# Patient Record
Sex: FEMALE | URBAN - METROPOLITAN AREA
[De-identification: names, ages, dates, MRNs, and addresses within clinical notes are randomized per-mention and may not be internally consistent; named-entity substitution may affect disease eponyms.]

---

## 2023-06-08 ENCOUNTER — TELEPHONE (OUTPATIENT)
Dept: OBGYN CLINIC | Age: 33
End: 2023-06-08

## 2023-06-08 NOTE — TELEPHONE ENCOUNTER
Pt LVM stating she needs meds for N/V, as she has been out of work X 3 days r/t morning sickness. Is not a current pt. Has 1st OB appt 06/19/23. Returned pt call. Advised pt that since she is not a current pt, we are not allowed to give medical advice or prescribe meds. She will need to visit the ED or UCC as she feels is needed.

## 2023-06-15 PROBLEM — N39.0 ACUTE UTI (URINARY TRACT INFECTION): Status: ACTIVE | Noted: 2021-09-05

## 2023-06-15 PROBLEM — N10 ACUTE PYELONEPHRITIS: Status: ACTIVE | Noted: 2021-09-05

## 2023-06-15 PROBLEM — G43.109 MIGRAINE WITH AURA AND WITHOUT STATUS MIGRAINOSUS, NOT INTRACTABLE: Status: ACTIVE | Noted: 2021-09-20

## 2023-06-15 PROBLEM — A74.9 CHLAMYDIAL INFECTION: Status: ACTIVE | Noted: 2019-08-26

## 2023-06-15 PROBLEM — N20.0 RIGHT NEPHROLITHIASIS: Status: ACTIVE | Noted: 2021-09-06

## 2023-06-15 PROBLEM — D64.9 CHRONIC ANEMIA: Status: ACTIVE | Noted: 2021-09-07

## 2023-06-15 PROBLEM — K08.89 TOOTH PAIN: Status: ACTIVE | Noted: 2023-01-26

## 2023-06-15 PROBLEM — E87.6 HYPOKALEMIA: Status: ACTIVE | Noted: 2021-09-05

## 2023-06-15 PROBLEM — N12 PYELONEPHRITIS OF RIGHT KIDNEY: Status: ACTIVE | Noted: 2021-09-05

## 2023-06-16 ENCOUNTER — PATIENT MESSAGE (OUTPATIENT)
Dept: OBGYN CLINIC | Age: 33
End: 2023-06-16

## 2023-06-19 PROBLEM — R82.71 GBS BACTERIURIA: Status: ACTIVE | Noted: 2023-06-19

## 2023-06-21 DIAGNOSIS — O21.9 NAUSEA/VOMITING IN PREGNANCY: Primary | ICD-10-CM

## 2023-06-21 RX ORDER — PYRIDOXINE HCL (VITAMIN B6) 25 MG
25 TABLET ORAL EVERY 8 HOURS PRN
Qty: 90 TABLET | Refills: 0 | Status: SHIPPED | OUTPATIENT
Start: 2023-06-21 | End: 2023-07-21

## 2023-06-28 ENCOUNTER — ROUTINE PRENATAL (OUTPATIENT)
Dept: OBGYN CLINIC | Age: 33
End: 2023-06-28
Payer: COMMERCIAL

## 2023-06-28 VITALS — SYSTOLIC BLOOD PRESSURE: 118 MMHG | WEIGHT: 161 LBS | DIASTOLIC BLOOD PRESSURE: 84 MMHG | BODY MASS INDEX: 27.64 KG/M2

## 2023-06-28 DIAGNOSIS — Z87.51 HISTORY OF PRETERM LABOR: ICD-10-CM

## 2023-06-28 DIAGNOSIS — Z36.3 ANTENATAL SCREENING FOR MALFORMATION USING ULTRASONICS: Primary | ICD-10-CM

## 2023-06-28 DIAGNOSIS — R82.71 GBS BACTERIURIA: ICD-10-CM

## 2023-06-28 DIAGNOSIS — A74.9 CHLAMYDIAL INFECTION: ICD-10-CM

## 2023-06-28 DIAGNOSIS — Z11.3 SCREEN FOR STD (SEXUALLY TRANSMITTED DISEASE): ICD-10-CM

## 2023-06-28 DIAGNOSIS — Z34.91 ENCOUNTER FOR SUPERVISION OF LOW-RISK PREGNANCY IN FIRST TRIMESTER: ICD-10-CM

## 2023-06-28 DIAGNOSIS — Z11.51 SCREENING FOR HPV (HUMAN PAPILLOMAVIRUS): ICD-10-CM

## 2023-06-28 DIAGNOSIS — Z12.4 PAP SMEAR FOR CERVICAL CANCER SCREENING: ICD-10-CM

## 2023-06-28 DIAGNOSIS — N92.6 MISSED MENSES: ICD-10-CM

## 2023-06-28 PROBLEM — N39.0 ACUTE UTI (URINARY TRACT INFECTION): Status: RESOLVED | Noted: 2021-09-05 | Resolved: 2023-06-28

## 2023-06-28 PROBLEM — K08.89 TOOTH PAIN: Status: RESOLVED | Noted: 2023-01-26 | Resolved: 2023-06-28

## 2023-06-28 PROBLEM — D64.9 CHRONIC ANEMIA: Status: RESOLVED | Noted: 2021-09-07 | Resolved: 2023-06-28

## 2023-06-28 PROBLEM — E87.6 HYPOKALEMIA: Status: RESOLVED | Noted: 2021-09-05 | Resolved: 2023-06-28

## 2023-06-28 PROCEDURE — 76801 OB US < 14 WKS SINGLE FETUS: CPT | Performed by: OBSTETRICS & GYNECOLOGY

## 2023-06-28 PROCEDURE — 99204 OFFICE O/P NEW MOD 45 MIN: CPT | Performed by: OBSTETRICS & GYNECOLOGY

## 2023-06-28 RX ORDER — DIPHENHYDRAMINE HCL 50 MG
1 CAPSULE ORAL DAILY
Qty: 30 CAPSULE | Refills: 3 | Status: SHIPPED | OUTPATIENT
Start: 2023-06-28

## 2023-06-28 RX ORDER — DIPHENHYDRAMINE HYDROCHLORIDE 25 MG/1
25 CAPSULE ORAL 3 TIMES DAILY
Qty: 90 TABLET | Refills: 3 | Status: SHIPPED | OUTPATIENT
Start: 2023-06-28

## 2023-06-28 SDOH — ECONOMIC STABILITY: FOOD INSECURITY: WITHIN THE PAST 12 MONTHS, THE FOOD YOU BOUGHT JUST DIDN'T LAST AND YOU DIDN'T HAVE MONEY TO GET MORE.: PATIENT DECLINED

## 2023-06-28 SDOH — ECONOMIC STABILITY: HOUSING INSECURITY
IN THE LAST 12 MONTHS, WAS THERE A TIME WHEN YOU DID NOT HAVE A STEADY PLACE TO SLEEP OR SLEPT IN A SHELTER (INCLUDING NOW)?: PATIENT REFUSED

## 2023-06-28 SDOH — ECONOMIC STABILITY: FOOD INSECURITY: WITHIN THE PAST 12 MONTHS, YOU WORRIED THAT YOUR FOOD WOULD RUN OUT BEFORE YOU GOT MONEY TO BUY MORE.: PATIENT DECLINED

## 2023-06-28 SDOH — ECONOMIC STABILITY: INCOME INSECURITY: HOW HARD IS IT FOR YOU TO PAY FOR THE VERY BASICS LIKE FOOD, HOUSING, MEDICAL CARE, AND HEATING?: PATIENT DECLINED

## 2023-07-05 ENCOUNTER — TELEPHONE (OUTPATIENT)
Dept: OBGYN CLINIC | Age: 33
End: 2023-07-05

## 2023-07-05 DIAGNOSIS — R82.71 GBS BACTERIURIA: Primary | ICD-10-CM

## 2023-07-05 LAB
C TRACH RRNA CVX QL NAA+PROBE: NEGATIVE
CYTOLOGIST CVX/VAG CYTO: NORMAL
CYTOLOGY CVX/VAG DOC THIN PREP: NORMAL
HPV APTIMA: NEGATIVE
HPV GENOTYPE REFLEX: NORMAL
Lab: NORMAL
Lab: NORMAL
N GONORRHOEA RRNA CVX QL NAA+PROBE: NEGATIVE
PATH REPORT.FINAL DX SPEC: NORMAL
STAT OF ADQ CVX/VAG CYTO-IMP: NORMAL
T VAGINALIS RRNA SPEC QL NAA+PROBE: NEGATIVE

## 2023-07-05 NOTE — TELEPHONE ENCOUNTER
[facility] Reports +GBS in urine but no abx when she went to the pharmacy. [facility] will ask Dr Krystal Mondragon for an RX and will send. Also will check on progesterone as having an issue filling that at the pharmacy as well.

## 2023-07-06 DIAGNOSIS — Z87.51 HISTORY OF PRETERM LABOR: ICD-10-CM

## 2023-07-06 DIAGNOSIS — O09.891 SUPERVISION OF OTHER HIGH RISK PREGNANCIES, FIRST TRIMESTER: Primary | ICD-10-CM

## 2023-07-06 RX ORDER — AMOXICILLIN 500 MG/1
500 CAPSULE ORAL EVERY 8 HOURS
Qty: 15 CAPSULE | Refills: 0 | Status: SHIPPED | OUTPATIENT
Start: 2023-07-06 | End: 2023-07-11

## 2023-07-19 ENCOUNTER — ROUTINE PRENATAL (OUTPATIENT)
Dept: OBGYN CLINIC | Age: 33
End: 2023-07-19

## 2023-07-19 VITALS — WEIGHT: 164 LBS | BODY MASS INDEX: 28.15 KG/M2 | SYSTOLIC BLOOD PRESSURE: 112 MMHG | DIASTOLIC BLOOD PRESSURE: 72 MMHG

## 2023-07-19 DIAGNOSIS — R82.71 GBS BACTERIURIA: ICD-10-CM

## 2023-07-19 DIAGNOSIS — Z34.81 ENCOUNTER FOR SUPERVISION OF OTHER NORMAL PREGNANCY IN FIRST TRIMESTER: ICD-10-CM

## 2023-07-19 DIAGNOSIS — Z87.51 HISTORY OF PRETERM LABOR: ICD-10-CM

## 2023-07-19 DIAGNOSIS — Z36.8A ENCOUNTER FOR ANTENATAL SCREENING FOR OTHER GENETIC DEFECTS: ICD-10-CM

## 2023-07-19 DIAGNOSIS — Z34.91 ENCOUNTER FOR SUPERVISION OF LOW-RISK PREGNANCY IN FIRST TRIMESTER: Primary | ICD-10-CM

## 2023-07-19 PROBLEM — Z34.90 SUPERVISION OF NORMAL PREGNANCY: Status: ACTIVE | Noted: 2023-07-19

## 2023-07-19 NOTE — PROGRESS NOTES
MARGO. [id number].  12w2d   Denies LOF, VB, Ctxs. Good FM.       Vitals:    23 1031   BP: 112/72        History of  labor  CL scheduled for 16 weeks    GBS bacteriuria   Ucx today     Supervision of normal pregnancy   NIPT today        Jocelyne Mancia DO

## 2023-07-22 LAB
BACTERIA SPEC CULT: NORMAL
SERVICE CMNT-IMP: NORMAL

## 2023-07-27 LAB
Lab: NORMAL
NTRA 1P36 DELETION SYNDROME POPULATION-BASED RISK TEXT: NORMAL
NTRA 1P36 DELETION SYNDROME RESULT TEXT: NORMAL
NTRA 1P36 DELETION SYNDROME RISK SCORE TEXT: NORMAL
NTRA 22Q11.2 DELETION SYNDROME POPULATION-BASED RISK TEXT: NORMAL
NTRA 22Q11.2 DELETION SYNDROME RESULT TEXT: NORMAL
NTRA 22Q11.2 DELETION SYNDROME RISK SCORE TEXT: NORMAL
NTRA ANGELMAN SYNDROME POPULATION-BASED RISK TEXT: NORMAL
NTRA ANGELMAN SYNDROME RESULT TEXT: NORMAL
NTRA ANGELMAN SYNDROME RISK SCORE TEXT: NORMAL
NTRA CRI-DU-CHAT SYNDROME POPULATION-BASED RISK TEXT: NORMAL
NTRA CRI-DU-CHAT SYNDROME RESULT TEXT: NORMAL
NTRA CRI-DU-CHAT SYNDROME RISK SCORE TEXT: NORMAL
NTRA FETAL FRACTION: NORMAL
NTRA GENDER OF FETUS: NORMAL
NTRA MONOSOMY X AGE-BASED RISK TEXT: NORMAL
NTRA MONOSOMY X RESULT TEXT: NORMAL
NTRA MONOSOMY X RISK SCORE TEXT: NORMAL
NTRA PRADER-WILLI SYNDROME POPULATION-BASED RISK TEXT: NORMAL
NTRA PRADER-WILLI SYNDROME RESULT TEXT: NORMAL
NTRA PRADER-WILLI SYNDROME RISK SCORE TEXT: NORMAL
NTRA TRIPLOIDY RESULT TEXT: NORMAL
NTRA TRISOMY 13 AGE-BASED RISK TEXT: NORMAL
NTRA TRISOMY 13 RESULT TEXT: NORMAL
NTRA TRISOMY 13 RISK SCORE TEXT: NORMAL
NTRA TRISOMY 18 AGE-BASED RISK TEXT: NORMAL
NTRA TRISOMY 18 RESULT TEXT: NORMAL
NTRA TRISOMY 18 RISK SCORE TEXT: NORMAL
NTRA TRISOMY 21 AGE-BASED RISK TEXT: NORMAL
NTRA TRISOMY 21 RESULT TEXT: NORMAL
NTRA TRISOMY 21 RISK SCORE TEXT: NORMAL

## 2023-08-09 ENCOUNTER — ROUTINE PRENATAL (OUTPATIENT)
Dept: OBGYN CLINIC | Age: 33
End: 2023-08-09

## 2023-08-09 VITALS — SYSTOLIC BLOOD PRESSURE: 114 MMHG | DIASTOLIC BLOOD PRESSURE: 76 MMHG | BODY MASS INDEX: 28.32 KG/M2 | WEIGHT: 165 LBS

## 2023-08-09 DIAGNOSIS — Z34.91 ENCOUNTER FOR SUPERVISION OF LOW-RISK PREGNANCY IN FIRST TRIMESTER: ICD-10-CM

## 2023-08-09 DIAGNOSIS — Z34.81 ENCOUNTER FOR SUPERVISION OF OTHER NORMAL PREGNANCY IN FIRST TRIMESTER: ICD-10-CM

## 2023-08-09 DIAGNOSIS — R82.71 GBS BACTERIURIA: ICD-10-CM

## 2023-08-09 DIAGNOSIS — Z87.51 HISTORY OF PRETERM LABOR: Primary | ICD-10-CM

## 2023-08-09 NOTE — PROGRESS NOTES
MARGO. [id number].  15w2d   Denies LOF, VB, Ctxs. Good FM. Vitals:    23 1011   BP: 114/76        History of  labor  CL today 5cm    Encounter for supervision of low-risk pregnancy in first trimester  Low risk NIPT   Anatomy can at [address] This Encounter   Procedures    AMB POC US OB TRANSVAGINAL     Order Specific Question:   Are you Pregnant? Answer:    Yes        Jocelyne Mancia, DO

## 2023-08-24 ENCOUNTER — HOSPITAL ENCOUNTER (OUTPATIENT)
Age: 33
Discharge: HOME | End: 2023-08-24
Attending: OBSTETRICS & GYNECOLOGY | Admitting: OBSTETRICS & GYNECOLOGY
Payer: COMMERCIAL

## 2023-08-24 ENCOUNTER — TELEPHONE (OUTPATIENT)
Dept: OBGYN CLINIC | Age: 33
End: 2023-08-24

## 2023-08-24 VITALS
HEART RATE: 74 BPM | TEMPERATURE: 98 F | RESPIRATION RATE: 18 BRPM | SYSTOLIC BLOOD PRESSURE: 104 MMHG | OXYGEN SATURATION: 99 % | DIASTOLIC BLOOD PRESSURE: 60 MMHG | HEIGHT: 64 IN | WEIGHT: 165 LBS | BODY MASS INDEX: 28.17 KG/M2

## 2023-08-24 PROCEDURE — 99282 EMERGENCY DEPT VISIT SF MDM: CPT

## 2023-08-24 NOTE — TELEPHONE ENCOUNTER
Pt called with c/o abdominal and low back pain since this morning. Pt is currently 17w3d pregnant with a h/o PTL/PTD. Pt reports on/off abdominal tightening. Pt denies LOF or vaginal bleeding. Pt instructed to go to [facility] L&D for evaluation. Pt agree's and voiced understanding.

## 2023-08-25 NOTE — PROGRESS NOTES
Pt presents to [facility] with c/o ctx since 0700 today. Pt denies vag bleeding and/or LOF. Abd tender upon palpation.  FHT: 145

## 2023-08-28 ENCOUNTER — TELEPHONE (OUTPATIENT)
Dept: OBGYN CLINIC | Age: 33
End: 2023-08-28

## 2023-08-28 NOTE — TELEPHONE ENCOUNTER
Pt called stating she tested positive for COVID on 8/26/23. Pt reports s/sx started on 8/25/523. Pt went to [facility] ED on 8/26/23. Pt reports sore throat, body aches and low grade fever. Safe otc medications/precautions reviewed and sent via [employer]. Pt voiced understanding. Will route message to  for additional recommendations.

## 2023-09-06 ENCOUNTER — ROUTINE PRENATAL (OUTPATIENT)
Dept: OBGYN CLINIC | Age: 33
End: 2023-09-06
Payer: COMMERCIAL

## 2023-09-06 VITALS — SYSTOLIC BLOOD PRESSURE: 116 MMHG | BODY MASS INDEX: 28.15 KG/M2 | WEIGHT: 164 LBS | DIASTOLIC BLOOD PRESSURE: 72 MMHG

## 2023-09-06 DIAGNOSIS — R82.71 GBS BACTERIURIA: Primary | ICD-10-CM

## 2023-09-06 DIAGNOSIS — Z34.91 ENCOUNTER FOR SUPERVISION OF LOW-RISK PREGNANCY IN FIRST TRIMESTER: ICD-10-CM

## 2023-09-06 DIAGNOSIS — Z36.89 ENCOUNTER FOR FETAL ANATOMIC SURVEY: ICD-10-CM

## 2023-09-06 DIAGNOSIS — Z87.51 HISTORY OF PRETERM LABOR: ICD-10-CM

## 2023-09-06 PROBLEM — Z34.90 SUPERVISION OF NORMAL PREGNANCY: Status: RESOLVED | Noted: 2023-07-19 | Resolved: 2023-09-06

## 2023-09-06 PROCEDURE — 76805 OB US >/= 14 WKS SNGL FETUS: CPT | Performed by: OBSTETRICS & GYNECOLOGY

## 2023-09-06 PROCEDURE — 99213 OFFICE O/P EST LOW 20 MIN: CPT | Performed by: OBSTETRICS & GYNECOLOGY

## 2023-09-06 NOTE — PROGRESS NOTES
MARGO. [id number].  19w2d   Denies LOF, VB, Ctxs. Good FM. Vitals:    09/06/23 1126   BP: 116/72        Encounter for supervision of low-risk pregnancy in first trimester  Anatomy scan today complete and normal   Recent COVID infection. Doing well and sx improved. Orders Placed This Encounter   Procedures    AMB POC US OB >= 14 WKS, 1ST GESTATION     Order Specific Question:   Are you Pregnant? Answer:    Yes        Jocelyne Mancia, DO

## 2023-10-04 ENCOUNTER — ROUTINE PRENATAL (OUTPATIENT)
Dept: OBGYN CLINIC | Age: 33
End: 2023-10-04
Payer: COMMERCIAL

## 2023-10-04 VITALS — SYSTOLIC BLOOD PRESSURE: 114 MMHG | BODY MASS INDEX: 28.49 KG/M2 | DIASTOLIC BLOOD PRESSURE: 70 MMHG | WEIGHT: 166 LBS

## 2023-10-04 DIAGNOSIS — R82.71 GBS BACTERIURIA: Primary | ICD-10-CM

## 2023-10-04 DIAGNOSIS — Z87.51 HISTORY OF PRETERM LABOR: ICD-10-CM

## 2023-10-04 DIAGNOSIS — Z13.0 SCREENING FOR IRON DEFICIENCY ANEMIA: ICD-10-CM

## 2023-10-04 DIAGNOSIS — Z34.91 ENCOUNTER FOR SUPERVISION OF LOW-RISK PREGNANCY IN FIRST TRIMESTER: ICD-10-CM

## 2023-10-04 DIAGNOSIS — A74.9 CHLAMYDIAL INFECTION: ICD-10-CM

## 2023-10-04 DIAGNOSIS — Z13.1 SCREENING FOR DIABETES MELLITUS (DM): ICD-10-CM

## 2023-10-04 PROCEDURE — 99213 OFFICE O/P EST LOW 20 MIN: CPT | Performed by: OBSTETRICS & GYNECOLOGY

## 2023-10-04 NOTE — PROGRESS NOTES
MARGO. [id number].  23w2d   Denies LOF, VB, Ctxs. Good FM.       Vitals:    10/04/23 0844   BP: 114/70        Encounter for supervision of low-risk pregnancy in first trimester  Glucola and CBC at [facility]     Orders Placed This Encounter   Procedures    Glucose tolerance, 1 hour only, single test     Standing Status:   Future     Standing Expiration Date:   10/4/2024    CBC     Standing Status:   Future     Standing Expiration Date:   10/4/2024        Jocelyne Mancia, DO

## 2023-10-31 ENCOUNTER — HOSPITAL ENCOUNTER (EMERGENCY)
Age: 33
Discharge: LEFT BEFORE DISCHARGE | End: 2023-10-31

## 2023-11-09 ENCOUNTER — ROUTINE PRENATAL (OUTPATIENT)
Dept: OBGYN CLINIC | Age: 33
End: 2023-11-09
Payer: COMMERCIAL

## 2023-11-09 VITALS — SYSTOLIC BLOOD PRESSURE: 118 MMHG | DIASTOLIC BLOOD PRESSURE: 64 MMHG | BODY MASS INDEX: 29.35 KG/M2 | WEIGHT: 171 LBS

## 2023-11-09 DIAGNOSIS — Z13.0 SCREENING FOR IRON DEFICIENCY ANEMIA: ICD-10-CM

## 2023-11-09 DIAGNOSIS — R82.71 GBS BACTERIURIA: Primary | ICD-10-CM

## 2023-11-09 DIAGNOSIS — Z87.51 HISTORY OF PRETERM LABOR: ICD-10-CM

## 2023-11-09 DIAGNOSIS — Z13.1 SCREENING FOR DIABETES MELLITUS (DM): ICD-10-CM

## 2023-11-09 DIAGNOSIS — Z34.91 ENCOUNTER FOR SUPERVISION OF LOW-RISK PREGNANCY IN FIRST TRIMESTER: ICD-10-CM

## 2023-11-09 LAB
ERYTHROCYTE [DISTWIDTH] IN BLOOD BY AUTOMATED COUNT: 13.1 % (ref 11.9–14.6)
HCT VFR BLD AUTO: 32.8 % (ref 35.8–46.3)
HGB BLD-MCNC: 11 G/DL (ref 11.7–15.4)
MCH RBC QN AUTO: 32.1 PG (ref 26.1–32.9)
MCHC RBC AUTO-ENTMCNC: 33.5 G/DL (ref 31.4–35)
MCV RBC AUTO: 95.6 FL (ref 82–102)
NRBC # BLD: 0 K/UL (ref 0–0.2)
PLATELET # BLD AUTO: 269 K/UL (ref 150–450)
PMV BLD AUTO: 10.1 FL (ref 9.4–12.3)
RBC # BLD AUTO: 3.43 M/UL (ref 4.05–5.2)
WBC # BLD AUTO: 5.9 K/UL (ref 4.3–11.1)

## 2023-11-09 PROCEDURE — 99213 OFFICE O/P EST LOW 20 MIN: CPT | Performed by: OBSTETRICS & GYNECOLOGY

## 2023-11-09 NOTE — ASSESSMENT & PLAN NOTE
Anatomy complete and normal.   NS to last appt where US was scheduled for serial CL  Not needed anymore as now > 28 weeks. Info given for [employer] as she wants a 3D image  Patient gave multiple different times for when she started her Glucola. No insistent she started it at 845 and can get her blood drawn.  Collect with CBC today  Kick counts and labor precautions reviewed

## 2023-11-09 NOTE — PROGRESS NOTES
MARGO. [id number].  28w3d   Denies LOF, VB, Ctxs. Good FM. Vitals:    11/09/23 0943   BP: 118/64        Encounter for supervision of low-risk pregnancy in first trimester  Anatomy complete and normal.   NS to last appt where US was scheduled for serial CL  Not needed anymore as now > 28 weeks. Info given for [employer] as she wants a 3D image  Patient gave multiple different times for when she started her Glucola. No insistent she started it at 845 and can get her blood drawn.  Collect with CBC today  Kick counts and labor precautions reviewed        Jocelyne Mancia, DO

## 2023-11-10 LAB — GLUCOSE 1 HOUR: 77 MG/DL

## 2023-11-27 ENCOUNTER — ROUTINE PRENATAL (OUTPATIENT)
Dept: OBGYN CLINIC | Age: 33
End: 2023-11-27
Payer: COMMERCIAL

## 2023-11-27 VITALS — BODY MASS INDEX: 29.18 KG/M2 | SYSTOLIC BLOOD PRESSURE: 112 MMHG | WEIGHT: 170 LBS | DIASTOLIC BLOOD PRESSURE: 74 MMHG

## 2023-11-27 DIAGNOSIS — Z87.51 HISTORY OF PRETERM LABOR: ICD-10-CM

## 2023-11-27 DIAGNOSIS — R82.71 GBS BACTERIURIA: Primary | ICD-10-CM

## 2023-11-27 DIAGNOSIS — Z34.91 ENCOUNTER FOR SUPERVISION OF LOW-RISK PREGNANCY IN FIRST TRIMESTER: ICD-10-CM

## 2023-11-27 PROBLEM — N12 PYELONEPHRITIS OF RIGHT KIDNEY: Status: RESOLVED | Noted: 2021-09-05 | Resolved: 2023-11-27

## 2023-11-27 PROCEDURE — 99213 OFFICE O/P EST LOW 20 MIN: CPT | Performed by: OBSTETRICS & GYNECOLOGY

## 2023-11-27 NOTE — PROGRESS NOTES
MARGO. [id number].  31w0d   Denies LOF, VB, Ctxs. Good FM. Vitals:    11/27/23 0935   BP: 112/74        Encounter for supervision of low-risk pregnancy in first trimester  Passed glucola  Kick counts and labor precautions reviewed   Flu vaccine recommended  FH 31.5. Patient requesting growth US. No indication based on exam and history. Understand insurance may reject this and she may be left with the bill.        Jocelyne Mancia, DO

## 2023-12-11 ENCOUNTER — ROUTINE PRENATAL (OUTPATIENT)
Dept: OBGYN CLINIC | Age: 33
End: 2023-12-11
Payer: COMMERCIAL

## 2023-12-11 VITALS — BODY MASS INDEX: 29.18 KG/M2 | WEIGHT: 170 LBS | DIASTOLIC BLOOD PRESSURE: 76 MMHG | SYSTOLIC BLOOD PRESSURE: 114 MMHG

## 2023-12-11 DIAGNOSIS — R82.71 GBS BACTERIURIA: Primary | ICD-10-CM

## 2023-12-11 DIAGNOSIS — Z34.91 ENCOUNTER FOR SUPERVISION OF LOW-RISK PREGNANCY IN FIRST TRIMESTER: ICD-10-CM

## 2023-12-11 DIAGNOSIS — Z87.51 HISTORY OF PRETERM LABOR: ICD-10-CM

## 2023-12-11 PROCEDURE — 99213 OFFICE O/P EST LOW 20 MIN: CPT | Performed by: OBSTETRICS & GYNECOLOGY

## 2023-12-11 PROCEDURE — 76816 OB US FOLLOW-UP PER FETUS: CPT | Performed by: OBSTETRICS & GYNECOLOGY

## 2023-12-11 RX ORDER — MAGNESIUM OXIDE 400 MG/1
400 TABLET ORAL 2 TIMES DAILY
Qty: 60 TABLET | Refills: 5 | Status: SHIPPED | OUTPATIENT
Start: 2023-12-11

## 2023-12-11 NOTE — PROGRESS NOTES
MAXWELL [id number].  33w0d   Denies LOF, VB, Ctxs. Good FM.      + increasing headaches. Not on anything for prevention. Reports staying hydrated. Vitals:    12/11/23 0841   BP: 114/76        Encounter for supervision of low-risk pregnancy in first trimester  Growth US (due to patient request): EFW 44, AC57%, normal SOCO  Patient reassured of normal growth and fluid   Kick counts and labor precautions reviewed       BP normal. Start Magnesium oxide 400 mg BID for headache prevention. Red flag sx reviewed. Orders Placed This Encounter   Medications    magnesium oxide (MAG-OX) 400 MG tablet     Sig: Take 1 tablet by mouth 2 times daily     Dispense:  60 tablet     Refill:  5        Orders Placed This Encounter   Procedures    AMB POC US OB RE-EVAL/FOLLOW UP     Order Specific Question:   Are you Pregnant? Answer:    Yes        Jocelyne Mancia, DO

## 2023-12-11 NOTE — ASSESSMENT & PLAN NOTE
Growth US (due to patient request): EFW 44, AC57%, normal SOCO  Patient reassured of normal growth and fluid. BPD and HC < 10%, but prior normal NIPT and intracranial anatomy.    Kick counts and labor precautions reviewed

## 2023-12-18 PROBLEM — O23.43 URINARY TRACT INFECTION IN MOTHER DURING THIRD TRIMESTER OF PREGNANCY: Status: ACTIVE | Noted: 2023-12-18

## 2023-12-18 PROBLEM — R10.9 ABDOMINAL PAIN DURING PREGNANCY IN THIRD TRIMESTER: Status: ACTIVE | Noted: 2023-12-18

## 2023-12-18 PROBLEM — O26.893 ABDOMINAL PAIN DURING PREGNANCY IN THIRD TRIMESTER: Status: ACTIVE | Noted: 2023-12-18

## 2023-12-18 PROBLEM — Z3A.34 34 WEEKS GESTATION OF PREGNANCY: Status: ACTIVE | Noted: 2023-12-18

## 2023-12-18 PROBLEM — O09.213 HISTORY OF PRETERM LABOR, CURRENT PREGNANCY, THIRD TRIMESTER: Status: ACTIVE | Noted: 2023-12-18

## 2023-12-26 ENCOUNTER — HOSPITAL ENCOUNTER (INPATIENT)
Age: 33
LOS: 2 days | Discharge: HOME | DRG: 563 | End: 2023-12-28
Attending: OBSTETRICS & GYNECOLOGY | Admitting: OBSTETRICS & GYNECOLOGY
Payer: COMMERCIAL

## 2023-12-26 PROBLEM — O60.03 PRETERM LABOR IN THIRD TRIMESTER WITHOUT DELIVERY: Status: ACTIVE | Noted: 2023-12-26

## 2023-12-26 LAB
ABO + RH BLD: NORMAL
BASOPHILS # BLD: 0 K/UL (ref 0–0.2)
BASOPHILS NFR BLD: 0 % (ref 0–2)
BLOOD BANK CMNT PATIENT-IMP: NORMAL
BLOOD GROUP ANTIBODIES SERPL: NORMAL
DIFFERENTIAL METHOD BLD: ABNORMAL
EOSINOPHIL # BLD: 0.1 K/UL (ref 0–0.8)
EOSINOPHIL NFR BLD: 2 % (ref 0.5–7.8)
ERYTHROCYTE [DISTWIDTH] IN BLOOD BY AUTOMATED COUNT: 13 % (ref 11.9–14.6)
HCT VFR BLD AUTO: 33.2 % (ref 35.8–46.3)
HGB BLD-MCNC: 11.3 G/DL (ref 11.7–15.4)
IMM GRANULOCYTES # BLD AUTO: 0 K/UL (ref 0–0.5)
IMM GRANULOCYTES NFR BLD AUTO: 0 % (ref 0–5)
LYMPHOCYTES # BLD: 1.7 K/UL (ref 0.5–4.6)
LYMPHOCYTES NFR BLD: 35 % (ref 13–44)
MCH RBC QN AUTO: 31.3 PG (ref 26.1–32.9)
MCHC RBC AUTO-ENTMCNC: 34 G/DL (ref 31.4–35)
MCV RBC AUTO: 92 FL (ref 82–102)
MONOCYTES # BLD: 0.6 K/UL (ref 0.1–1.3)
MONOCYTES NFR BLD: 12 % (ref 4–12)
NEUTS SEG # BLD: 2.5 K/UL (ref 1.7–8.2)
NEUTS SEG NFR BLD: 50 % (ref 43–78)
NRBC # BLD: 0 K/UL (ref 0–0.2)
PLATELET # BLD AUTO: 282 K/UL (ref 150–450)
PMV BLD AUTO: 9.4 FL (ref 9.4–12.3)
RBC # BLD AUTO: 3.61 M/UL (ref 4.05–5.2)
SPECIMEN EXP DATE BLD: NORMAL
WBC # BLD AUTO: 4.9 K/UL (ref 4.3–11.1)

## 2023-12-26 PROCEDURE — 2580000003 HC RX 258: Performed by: OBSTETRICS & GYNECOLOGY

## 2023-12-26 PROCEDURE — 86850 RBC ANTIBODY SCREEN: CPT

## 2023-12-26 PROCEDURE — 99285 EMERGENCY DEPT VISIT HI MDM: CPT

## 2023-12-26 PROCEDURE — 85025 COMPLETE CBC W/AUTO DIFF WBC: CPT

## 2023-12-26 PROCEDURE — 86901 BLOOD TYPING SEROLOGIC RH(D): CPT

## 2023-12-26 PROCEDURE — 1100000000 HC RM PRIVATE

## 2023-12-26 PROCEDURE — 59025 FETAL NON-STRESS TEST: CPT

## 2023-12-26 PROCEDURE — 86900 BLOOD TYPING SEROLOGIC ABO: CPT

## 2023-12-26 PROCEDURE — 6360000002 HC RX W HCPCS: Performed by: OBSTETRICS & GYNECOLOGY

## 2023-12-26 PROCEDURE — 36415 COLL VENOUS BLD VENIPUNCTURE: CPT

## 2023-12-26 RX ORDER — ACETAMINOPHEN 325 MG/1
650 TABLET ORAL EVERY 4 HOURS PRN
Status: DISCONTINUED | OUTPATIENT
Start: 2023-12-26 | End: 2023-12-28 | Stop reason: HOSPADM

## 2023-12-26 RX ORDER — SODIUM CHLORIDE 0.9 % (FLUSH) 0.9 %
5-40 SYRINGE (ML) INJECTION EVERY 12 HOURS SCHEDULED
Status: DISCONTINUED | OUTPATIENT
Start: 2023-12-26 | End: 2023-12-28 | Stop reason: HOSPADM

## 2023-12-26 RX ORDER — SODIUM CHLORIDE 0.9 % (FLUSH) 0.9 %
5-40 SYRINGE (ML) INJECTION PRN
Status: DISCONTINUED | OUTPATIENT
Start: 2023-12-26 | End: 2023-12-28 | Stop reason: HOSPADM

## 2023-12-26 RX ORDER — SODIUM CHLORIDE, SODIUM LACTATE, POTASSIUM CHLORIDE, CALCIUM CHLORIDE 600; 310; 30; 20 MG/100ML; MG/100ML; MG/100ML; MG/100ML
INJECTION, SOLUTION INTRAVENOUS CONTINUOUS
Status: DISCONTINUED | OUTPATIENT
Start: 2023-12-26 | End: 2023-12-28 | Stop reason: HOSPADM

## 2023-12-26 RX ORDER — SODIUM CHLORIDE 9 MG/ML
INJECTION, SOLUTION INTRAVENOUS PRN
Status: DISCONTINUED | OUTPATIENT
Start: 2023-12-26 | End: 2023-12-28 | Stop reason: HOSPADM

## 2023-12-26 RX ORDER — BETAMETHASONE SODIUM PHOSPHATE AND BETAMETHASONE ACETATE 3; 3 MG/ML; MG/ML
12 INJECTION, SUSPENSION INTRA-ARTICULAR; INTRALESIONAL; INTRAMUSCULAR; SOFT TISSUE EVERY 24 HOURS
Status: COMPLETED | OUTPATIENT
Start: 2023-12-26 | End: 2023-12-27

## 2023-12-26 RX ORDER — ONDANSETRON 4 MG/1
4 TABLET, ORALLY DISINTEGRATING ORAL EVERY 8 HOURS PRN
Status: DISCONTINUED | OUTPATIENT
Start: 2023-12-26 | End: 2023-12-28 | Stop reason: HOSPADM

## 2023-12-26 RX ORDER — ONDANSETRON 2 MG/ML
4 INJECTION INTRAMUSCULAR; INTRAVENOUS EVERY 6 HOURS PRN
Status: DISCONTINUED | OUTPATIENT
Start: 2023-12-26 | End: 2023-12-28 | Stop reason: HOSPADM

## 2023-12-26 RX ORDER — ACETAMINOPHEN 650 MG/1
650 SUPPOSITORY RECTAL EVERY 4 HOURS PRN
Status: DISCONTINUED | OUTPATIENT
Start: 2023-12-26 | End: 2023-12-28 | Stop reason: HOSPADM

## 2023-12-26 RX ORDER — SODIUM CHLORIDE, SODIUM LACTATE, POTASSIUM CHLORIDE, AND CALCIUM CHLORIDE .6; .31; .03; .02 G/100ML; G/100ML; G/100ML; G/100ML
1000 INJECTION, SOLUTION INTRAVENOUS ONCE
Status: DISCONTINUED | OUTPATIENT
Start: 2023-12-26 | End: 2023-12-28 | Stop reason: HOSPADM

## 2023-12-26 RX ADMIN — SODIUM CHLORIDE, SODIUM LACTATE, POTASSIUM CHLORIDE, AND CALCIUM CHLORIDE 1000 ML: .6; .31; .03; .02 INJECTION, SOLUTION INTRAVENOUS at 19:44

## 2023-12-26 RX ADMIN — SODIUM CHLORIDE 5 MILLION UNITS: 900 INJECTION INTRAVENOUS at 21:34

## 2023-12-26 RX ADMIN — BETAMETHASONE SODIUM PHOSPHATE AND BETAMETHASONE ACETATE 12 MG: 3; 3 INJECTION, SUSPENSION INTRA-ARTICULAR; INTRALESIONAL; INTRAMUSCULAR at 21:12

## 2023-12-26 RX ADMIN — SODIUM CHLORIDE, POTASSIUM CHLORIDE, SODIUM LACTATE AND CALCIUM CHLORIDE: 600; 310; 30; 20 INJECTION, SOLUTION INTRAVENOUS at 21:34

## 2023-12-26 NOTE — PROGRESS NOTES
Patient presents to [facility] ED with complaints of contractions. Triage assessment as documented.  made aware.

## 2023-12-27 PROCEDURE — 6370000000 HC RX 637 (ALT 250 FOR IP): Performed by: OBSTETRICS & GYNECOLOGY

## 2023-12-27 PROCEDURE — 2580000003 HC RX 258: Performed by: OBSTETRICS & GYNECOLOGY

## 2023-12-27 PROCEDURE — 6360000002 HC RX W HCPCS: Performed by: OBSTETRICS & GYNECOLOGY

## 2023-12-27 PROCEDURE — 1100000000 HC RM PRIVATE

## 2023-12-27 RX ORDER — ZOLPIDEM TARTRATE 5 MG/1
5 TABLET ORAL NIGHTLY PRN
Status: DISCONTINUED | OUTPATIENT
Start: 2023-12-27 | End: 2023-12-28 | Stop reason: HOSPADM

## 2023-12-27 RX ADMIN — SODIUM CHLORIDE 2.5 MILLION UNITS: 9 INJECTION, SOLUTION INTRAVENOUS at 09:44

## 2023-12-27 RX ADMIN — SODIUM CHLORIDE 2.5 MILLION UNITS: 9 INJECTION, SOLUTION INTRAVENOUS at 13:51

## 2023-12-27 RX ADMIN — SODIUM CHLORIDE 2.5 MILLION UNITS: 9 INJECTION, SOLUTION INTRAVENOUS at 05:46

## 2023-12-27 RX ADMIN — SODIUM CHLORIDE 2.5 MILLION UNITS: 9 INJECTION, SOLUTION INTRAVENOUS at 01:30

## 2023-12-27 RX ADMIN — BUTORPHANOL TARTRATE 1 MG: 2 INJECTION, SOLUTION INTRAMUSCULAR; INTRAVENOUS at 01:22

## 2023-12-27 RX ADMIN — SODIUM CHLORIDE, POTASSIUM CHLORIDE, SODIUM LACTATE AND CALCIUM CHLORIDE: 600; 310; 30; 20 INJECTION, SOLUTION INTRAVENOUS at 09:28

## 2023-12-27 RX ADMIN — ZOLPIDEM TARTRATE 5 MG: 5 TABLET ORAL at 21:15

## 2023-12-27 RX ADMIN — BETAMETHASONE SODIUM PHOSPHATE AND BETAMETHASONE ACETATE 12 MG: 3; 3 INJECTION, SUSPENSION INTRA-ARTICULAR; INTRALESIONAL; INTRAMUSCULAR at 21:15

## 2023-12-27 RX ADMIN — SODIUM CHLORIDE 2.5 MILLION UNITS: 9 INJECTION, SOLUTION INTRAVENOUS at 17:40

## 2023-12-28 VITALS
HEART RATE: 90 BPM | OXYGEN SATURATION: 100 % | TEMPERATURE: 98.5 F | DIASTOLIC BLOOD PRESSURE: 61 MMHG | SYSTOLIC BLOOD PRESSURE: 114 MMHG | RESPIRATION RATE: 16 BRPM

## 2023-12-28 PROBLEM — R10.9 ABDOMINAL PAIN DURING PREGNANCY IN THIRD TRIMESTER: Status: RESOLVED | Noted: 2023-12-18 | Resolved: 2023-12-28

## 2023-12-28 PROBLEM — O23.43 URINARY TRACT INFECTION IN MOTHER DURING THIRD TRIMESTER OF PREGNANCY: Status: RESOLVED | Noted: 2023-12-18 | Resolved: 2023-12-28

## 2023-12-28 PROBLEM — O26.893 ABDOMINAL PAIN DURING PREGNANCY IN THIRD TRIMESTER: Status: RESOLVED | Noted: 2023-12-18 | Resolved: 2023-12-28

## 2023-12-28 PROBLEM — Z3A.34 34 WEEKS GESTATION OF PREGNANCY: Status: RESOLVED | Noted: 2023-12-18 | Resolved: 2023-12-28

## 2023-12-28 PROCEDURE — 59025 FETAL NON-STRESS TEST: CPT

## 2023-12-28 PROCEDURE — 6370000000 HC RX 637 (ALT 250 FOR IP): Performed by: OBSTETRICS & GYNECOLOGY

## 2023-12-28 PROCEDURE — 59025 FETAL NON-STRESS TEST: CPT | Performed by: OBSTETRICS & GYNECOLOGY

## 2023-12-28 PROCEDURE — 99238 HOSP IP/OBS DSCHRG MGMT 30/<: CPT | Performed by: OBSTETRICS & GYNECOLOGY

## 2023-12-28 RX ADMIN — ACETAMINOPHEN 650 MG: 325 TABLET, FILM COATED ORAL at 08:37

## 2023-12-28 NOTE — PROGRESS NOTES
Dr. Rubi Sark in to see patient and discuss plan of care. Orders recd to dishcharge patient home with labor precautions. Patient agrees with plan of care. Discharge paperwork in progress.

## 2023-12-28 NOTE — PROGRESS NOTES
Discharge information given to patient on  labor. Patient verbalized understanding. No questions or needs noted. Patient left unit with significant other.

## 2023-12-28 NOTE — PLAN OF CARE

## 2024-01-03 ENCOUNTER — ROUTINE PRENATAL (OUTPATIENT)
Dept: OBGYN CLINIC | Age: 34
End: 2024-01-03
Payer: COMMERCIAL

## 2024-01-03 VITALS — DIASTOLIC BLOOD PRESSURE: 74 MMHG | BODY MASS INDEX: 29.52 KG/M2 | SYSTOLIC BLOOD PRESSURE: 118 MMHG | WEIGHT: 172 LBS

## 2024-01-03 DIAGNOSIS — O36.8130 DECREASED FETAL MOVEMENTS IN THIRD TRIMESTER, SINGLE OR UNSPECIFIED FETUS: ICD-10-CM

## 2024-01-03 DIAGNOSIS — Z34.91 ENCOUNTER FOR SUPERVISION OF LOW-RISK PREGNANCY IN FIRST TRIMESTER: ICD-10-CM

## 2024-01-03 DIAGNOSIS — R82.71 GBS BACTERIURIA: Primary | ICD-10-CM

## 2024-01-03 DIAGNOSIS — Z87.51 HISTORY OF PRETERM LABOR: ICD-10-CM

## 2024-01-03 PROCEDURE — 59025 FETAL NON-STRESS TEST: CPT | Performed by: OBSTETRICS & GYNECOLOGY

## 2024-01-03 PROCEDURE — 99213 OFFICE O/P EST LOW 20 MIN: CPT | Performed by: OBSTETRICS & GYNECOLOGY

## 2024-01-03 NOTE — ASSESSMENT & PLAN NOTE
12/11 growth US (due to patient request): EFW 44, AC57%, normal SOCO  Admission 12/26 > 12/28 for PTCs. BMZ mature. Discharged home unchanged.

## 2024-01-03 NOTE — PROGRESS NOTES
MAXWELL .  36w2d   Denies LOF, VB, Ctxs.  Decreased FM, but not doing her kick counts.       Admitted for PTCs and discharged. Then went to [facility] on  with no change. Asking why she can't be induced as this is the longest she has gone with a pregnancy and her priors did not go to the NICU. Reports she is miserable and has pressure.     Asking why I cannot strip her membranes to induce labor.     Vitals:    24 0932   BP: 118/74      SVE /40/-3, head not well applied     Encounter for supervision of low-risk pregnancy in first trimester   growth US (due to patient request): EFW 44, AC57%, normal SOCO  Admission  >  for PTCs. BMZ mature. Discharged home unchanged.     Discussion with patient on increased risk to baby and for  complications with PTD. Cannot induce her just for discomfort. High chance she will labor on her own and strict  labor precautions reviewed, but PTCs alone are not an indication  for  induction.     Kick counts and labor precautions reviewed      Erendira Marmolejo   24     Estimated Date of Delivery: 24   Patient's last menstrual period was 2023 (approximate).     Indication: decreased FM  Duration of monitorin minutes   Baseline: 140  Variability: Moderate  Accelerations: 15x15  Decelerations: none    [place]: irritability     Impression: Reactive       Orders Placed This Encounter   Procedures    FETAL NON-STRESS TEST        Jocelyne Mancia DO

## 2024-01-10 NOTE — PROGRESS NOTES
OB return  Erendira Marmolejo is a 33 y.o. female   with 37w3d IUP with Estimated Date of Delivery: 24 presenting to the clinic as a routine OB.     States has been regular contractions however, unable to state how far apart as has not been timing them today. Was admitted at 36 weeks for PTC's without delivery on - BMZ mature.   She denies leakage of fluid or vaginal bleeding and reports active fetal movement.     Denies HA, blurred vision or RUQ pain.     Taking prenatal vitamins: Yes    FHTs: 140s    Problem list reviewed and updated    Pregnancy complicated by:  1. GBS bacteriuria-will treat IP.   2. Hx  labor-Hx of PTL x 3 (35-36 weeks). growth US (due to patient request): EFW 44, AC57%, normal SOCO   3. Hx of STDs-Hx of trich and chlamydia. GC/CT neg  4. Right nephrolithiasis-Patient was recently admitted for pyelonephritis and found to have nephro lithiasis. Has upcoming urology appointment.     Physical Examination:  /68   Wt 77.6 kg (171 lb)   LMP 2023 (Approximate)   BMI 29.35 kg/m²    Gen: AAOx3  Ab: soft, NTTP, gravid uterus  Skin: no edema  SVE: 2-370/-2    Plan:  --SVE: 2-3/70/-2, patient requesting membrane sweep today. Membrane sweep done.   --Discussed GBS pos and will plan to treat IP.   --Most recent Hgb collected on =11.3  --RTC 1 week for routine ob visit.    Strict PTL/labor precautions, FMC, and pregnancy warning signs reviewed. Pt advised to call the office at [phone] or go straight to Labor and Delivery at [facility] with any of the following concerns vaginal bleeding, leaking of fluid, ron regularly Q 5-7 minutes for over an hour or not feeling the baby move.     Kick counts and labor precautions reviewed.

## 2024-01-11 ENCOUNTER — ROUTINE PRENATAL (OUTPATIENT)
Dept: OBGYN CLINIC | Age: 34
End: 2024-01-11
Payer: COMMERCIAL

## 2024-01-11 VITALS — DIASTOLIC BLOOD PRESSURE: 68 MMHG | WEIGHT: 171 LBS | BODY MASS INDEX: 29.35 KG/M2 | SYSTOLIC BLOOD PRESSURE: 121 MMHG

## 2024-01-11 DIAGNOSIS — Z87.51 HISTORY OF PRETERM LABOR: ICD-10-CM

## 2024-01-11 DIAGNOSIS — O09.213 HISTORY OF PRETERM LABOR, CURRENT PREGNANCY, THIRD TRIMESTER: ICD-10-CM

## 2024-01-11 DIAGNOSIS — O60.03 PRETERM LABOR IN THIRD TRIMESTER WITHOUT DELIVERY: ICD-10-CM

## 2024-01-11 DIAGNOSIS — A74.9 CHLAMYDIAL INFECTION: ICD-10-CM

## 2024-01-11 DIAGNOSIS — Z3A.37 37 WEEKS GESTATION OF PREGNANCY: ICD-10-CM

## 2024-01-11 DIAGNOSIS — Z34.91 ENCOUNTER FOR SUPERVISION OF LOW-RISK PREGNANCY IN FIRST TRIMESTER: Primary | ICD-10-CM

## 2024-01-11 DIAGNOSIS — R82.71 GBS BACTERIURIA: ICD-10-CM

## 2024-01-11 PROCEDURE — 99213 OFFICE O/P EST LOW 20 MIN: CPT | Performed by: NURSE PRACTITIONER

## 2024-01-11 NOTE — PATIENT INSTRUCTIONS
PTL/labor precautions, FMC, and pregnancy warning signs reviewed. Pt advised to call the office at [phone] or go straight to Labor and Delivery at [facility] with any of the following concerns vaginal bleeding, leaking of fluid, ron regularly Q 5-7 minutes for over an hour or not feeling the baby move.     Kick counts and labor precautions reviewed

## 2024-01-12 ENCOUNTER — HOSPITAL ENCOUNTER (INPATIENT)
Age: 34
LOS: 1 days | Discharge: HOME | End: 2024-01-13
Attending: OBSTETRICS & GYNECOLOGY | Admitting: OBSTETRICS & GYNECOLOGY
Payer: COMMERCIAL

## 2024-01-12 PROCEDURE — 1100000000 HC RM PRIVATE

## 2024-01-13 VITALS
HEART RATE: 82 BPM | OXYGEN SATURATION: 99 % | SYSTOLIC BLOOD PRESSURE: 118 MMHG | DIASTOLIC BLOOD PRESSURE: 75 MMHG | RESPIRATION RATE: 16 BRPM | TEMPERATURE: 98.6 F

## 2024-01-13 PROBLEM — O47.1 FALSE LABOR AFTER 37 COMPLETED WEEKS OF GESTATION: Status: ACTIVE | Noted: 2024-01-13

## 2024-01-13 PROBLEM — Z3A.37 37 WEEKS GESTATION OF PREGNANCY: Status: ACTIVE | Noted: 2024-01-13

## 2024-01-13 PROCEDURE — 59025 FETAL NON-STRESS TEST: CPT

## 2024-01-13 PROCEDURE — 99282 EMERGENCY DEPT VISIT SF MDM: CPT

## 2024-01-13 ASSESSMENT — ENCOUNTER SYMPTOMS
VOMITING: 0
NAUSEA: 0
BACK PAIN: 1
ABDOMINAL PAIN: 1

## 2024-01-13 NOTE — PROGRESS NOTES
Provider Testing: nonstress test    Indications:  37.5 weeks,  contractions    NST results::  Reactive    EFM:  baseline 130, accelerations present,  decelerations absent, moderate variablity  [place]:  q 7-8 minutes, lasting   60-80 seconds, mild to moderate    Start: 12:06 am  End:  12:35 am    Category:  1

## 2024-01-13 NOTE — H&P
HISTORY AND PHYSICAL             Date: 2024        Patient Name: Erendira Marmolejo     YOB: 1990      Age:  33 y.o.    Chief Complaint     Chief Complaint   Patient presents with    Contractions           History Obtained From   patient    History of Present Illness   patient is a 34 yo  with SERJIO 2024 at  37 weeks 5 days by lmp who presents with contractions    Onset: last evening about 10 pm  Quality:  painful every 5 minutes  Severity:  6/10  Associated: reports good fetal movement, no VB or LOF ; cervix was 2-3/70%/-2 at last office visit on 2024    Prenatal Care: [facility] - prenatal records reviewed, pregnancy complicated by hx of Ptd, GBS Bacteruria    OB History    Para Term  AB Living   7 4 1 3 2 4   SAB IAB Ectopic Molar Multiple Live Births   1   1            # Outcome Date GA Lbr Christopher/2nd Weight Sex Delivery Anes PTL Lv   7 Current            6  17 35w0d  2.722 kg (6 lb) M Vag-Spont         Complications:  Labor, Gestational diabetes,  delivery   5  02/06/15 36w0d  2.381 kg (5 lb 4 oz) F Vag-Spont         Complications:  Labor, Gestational diabetes,  delivery   4  07/15/12 35w0d  2.268 kg (5 lb) M Vag-Spont         Complications:  Labor, Gestational diabetes,  delivery   3 SAB               Birth Comments: D&C   2 Ectopic 2012           1 Term 11 37w0d  3.402 kg (7 lb 8 oz) M Vag-Spont         Complications: Gestational diabetes         Past Medical History     Past Medical History:   Diagnosis Date    Acute UTI 2021    Chlamydia 2019    Chronic anemia 2021    Hypokalemia 2021    Migraine with aura 2021    Right nephrolithiasis 2021    Trichimoniasis 2023    treated by ED        Past Surgical History     Past Surgical History:   Procedure Laterality Date    DILATION AND CURETTAGE OF UTERUS      SAB    ECTOPIC PREGNANCY SURGERY

## 2024-01-13 NOTE — DISCHARGE INSTRUCTIONS
Activity as tolerated  No heavy lifting, pushing or pulling  Rest and sleep on your side  Continue a regular diet with 8-10 glasses of water a day    Call your provider if any of the following:  Excessive vaginal bleeding like a period  Your water breaks  Contractions:           -Greater than 37 weeks: every 4-5 min lasting 40-60 seconds for 1-2 hours          -Less than 37 weeks: 6 or more contractions per hour that do not improve with rest and hydration  4. Decreased fetal movement: your baby should move 10 times in a 2 hour period  5. Severe pain     Week 37 of Your Pregnancy: Care Instructions    Most babies are born between 37 and 40 weeks.   This is a good time to pack a bag to take with you to the birth. Then it will be ready to go when you are.     Learn about breastfeeding.  For example, find out about ways to hold your baby to make breastfeeding easier. And think about learning how to pump and store milk.     Know that crying is normal.  It's common for babies to cry 1 to 3 hours a day. Some cry more, and some cry less.     Learn why babies cry.  They may be hungry; have gas; need a diaper change; or feel cold, warm, tired, lonely, or tense. Sometimes they cry for unknown reasons.     Think about what will help you stay calm when your baby cries.  Taking slow, deep breaths can help. So can taking a break. It's okay to put your baby somewhere safe (like their crib) and walk away for a few minutes.     Learn about safe sleep for your baby.  Always put your baby to sleep on their back. Place them alone in a crib or bassinet with a firm, flat surface. Keep soft items like stuffed animals out of the crib.     Learn what to expect with  poop.  Your baby will have their own bowel patterns. Some babies have several bowel movements a day. Some have fewer.     Know that  babies will often have loose, yellow bowel movements.  Formula-fed babies have more formed stools. If your baby's poop looks like

## 2024-01-17 ENCOUNTER — ROUTINE PRENATAL (OUTPATIENT)
Dept: OBGYN CLINIC | Age: 34
End: 2024-01-17
Payer: COMMERCIAL

## 2024-01-17 VITALS — SYSTOLIC BLOOD PRESSURE: 118 MMHG | DIASTOLIC BLOOD PRESSURE: 76 MMHG | BODY MASS INDEX: 29.87 KG/M2 | WEIGHT: 174 LBS

## 2024-01-17 DIAGNOSIS — R82.71 GBS BACTERIURIA: Primary | ICD-10-CM

## 2024-01-17 DIAGNOSIS — Z87.51 HISTORY OF PRETERM LABOR: ICD-10-CM

## 2024-01-17 DIAGNOSIS — Z34.91 ENCOUNTER FOR SUPERVISION OF LOW-RISK PREGNANCY IN FIRST TRIMESTER: ICD-10-CM

## 2024-01-17 PROBLEM — Z3A.37 37 WEEKS GESTATION OF PREGNANCY: Status: RESOLVED | Noted: 2024-01-13 | Resolved: 2024-01-17

## 2024-01-17 PROBLEM — O47.1 FALSE LABOR AFTER 37 COMPLETED WEEKS OF GESTATION: Status: RESOLVED | Noted: 2024-01-13 | Resolved: 2024-01-17

## 2024-01-17 PROBLEM — O60.03 PRETERM LABOR IN THIRD TRIMESTER WITHOUT DELIVERY: Status: RESOLVED | Noted: 2023-12-26 | Resolved: 2024-01-17

## 2024-01-17 PROCEDURE — 99213 OFFICE O/P EST LOW 20 MIN: CPT | Performed by: OBSTETRICS & GYNECOLOGY

## 2024-01-18 ENCOUNTER — TELEPHONE (OUTPATIENT)
Dept: OBGYN CLINIC | Age: 34
End: 2024-01-18

## 2024-01-18 NOTE — TELEPHONE ENCOUNTER
Originally placed request for EIOL on 1/22/24 with Dr Yusuf.  Per Antepartum scheduling they are full that day.      Induction scheduled with [facility]. Induction on 1/23/24 (ok per Dr Mancia) with Dr Diego d/t EIOL.   Pt notified of instructions.

## 2024-01-23 ENCOUNTER — HOSPITAL ENCOUNTER (INPATIENT)
Age: 34
LOS: 1 days | Discharge: HOME | DRG: 560 | End: 2024-01-24
Attending: OBSTETRICS & GYNECOLOGY | Admitting: OBSTETRICS & GYNECOLOGY
Payer: COMMERCIAL

## 2024-01-23 ENCOUNTER — ANESTHESIA (OUTPATIENT)
Dept: LABOR AND DELIVERY | Age: 34
DRG: 560 | End: 2024-01-23
Payer: COMMERCIAL

## 2024-01-23 ENCOUNTER — APPOINTMENT (OUTPATIENT)
Dept: LABOR AND DELIVERY | Age: 34
DRG: 560 | End: 2024-01-23
Payer: COMMERCIAL

## 2024-01-23 ENCOUNTER — ANESTHESIA EVENT (OUTPATIENT)
Dept: LABOR AND DELIVERY | Age: 34
DRG: 560 | End: 2024-01-23
Payer: COMMERCIAL

## 2024-01-23 PROBLEM — O09.93 SUPERVISION OF HIGH RISK PREGNANCY IN THIRD TRIMESTER: Status: ACTIVE | Noted: 2024-01-23

## 2024-01-23 LAB
ABO + RH BLD: NORMAL
BASOPHILS # BLD: 0 K/UL (ref 0–0.2)
BASOPHILS NFR BLD: 0 % (ref 0–2)
BLOOD GROUP ANTIBODIES SERPL: NORMAL
DIFFERENTIAL METHOD BLD: ABNORMAL
EOSINOPHIL # BLD: 0 K/UL (ref 0–0.8)
EOSINOPHIL NFR BLD: 1 % (ref 0.5–7.8)
ERYTHROCYTE [DISTWIDTH] IN BLOOD BY AUTOMATED COUNT: 13.1 % (ref 11.9–14.6)
HCT VFR BLD AUTO: 33.5 % (ref 35.8–46.3)
HGB BLD-MCNC: 11.5 G/DL (ref 11.7–15.4)
IMM GRANULOCYTES # BLD AUTO: 0 K/UL (ref 0–0.5)
IMM GRANULOCYTES NFR BLD AUTO: 0 % (ref 0–5)
LYMPHOCYTES # BLD: 1.6 K/UL (ref 0.5–4.6)
LYMPHOCYTES NFR BLD: 28 % (ref 13–44)
MCH RBC QN AUTO: 31.3 PG (ref 26.1–32.9)
MCHC RBC AUTO-ENTMCNC: 34.3 G/DL (ref 31.4–35)
MCV RBC AUTO: 91.3 FL (ref 82–102)
MONOCYTES # BLD: 0.7 K/UL (ref 0.1–1.3)
MONOCYTES NFR BLD: 13 % (ref 4–12)
NEUTS SEG # BLD: 3.2 K/UL (ref 1.7–8.2)
NEUTS SEG NFR BLD: 57 % (ref 43–78)
NRBC # BLD: 0 K/UL (ref 0–0.2)
PLATELET # BLD AUTO: 265 K/UL (ref 150–450)
PMV BLD AUTO: 9.6 FL (ref 9.4–12.3)
RBC # BLD AUTO: 3.67 M/UL (ref 4.05–5.2)
SPECIMEN EXP DATE BLD: NORMAL
WBC # BLD AUTO: 5.6 K/UL (ref 4.3–11.1)

## 2024-01-23 PROCEDURE — 59409 OBSTETRICAL CARE: CPT | Performed by: OBSTETRICS & GYNECOLOGY

## 2024-01-23 PROCEDURE — 86850 RBC ANTIBODY SCREEN: CPT

## 2024-01-23 PROCEDURE — 0UQMXZZ REPAIR VULVA, EXTERNAL APPROACH: ICD-10-PCS | Performed by: OBSTETRICS & GYNECOLOGY

## 2024-01-23 PROCEDURE — 3700000025 EPIDURAL BLOCK: Performed by: ANESTHESIOLOGY

## 2024-01-23 PROCEDURE — 2580000003 HC RX 258: Performed by: OBSTETRICS & GYNECOLOGY

## 2024-01-23 PROCEDURE — 86901 BLOOD TYPING SEROLOGIC RH(D): CPT

## 2024-01-23 PROCEDURE — 6360000002 HC RX W HCPCS: Performed by: NURSE ANESTHETIST, CERTIFIED REGISTERED

## 2024-01-23 PROCEDURE — 3E033VJ INTRODUCTION OF OTHER HORMONE INTO PERIPHERAL VEIN, PERCUTANEOUS APPROACH: ICD-10-PCS | Performed by: OBSTETRICS & GYNECOLOGY

## 2024-01-23 PROCEDURE — 7100000010 HC PHASE II RECOVERY - FIRST 15 MIN

## 2024-01-23 PROCEDURE — 2500000003 HC RX 250 WO HCPCS: Performed by: NURSE ANESTHETIST, CERTIFIED REGISTERED

## 2024-01-23 PROCEDURE — 6370000000 HC RX 637 (ALT 250 FOR IP): Performed by: OBSTETRICS & GYNECOLOGY

## 2024-01-23 PROCEDURE — 1100000000 HC RM PRIVATE

## 2024-01-23 PROCEDURE — 6360000002 HC RX W HCPCS: Performed by: OBSTETRICS & GYNECOLOGY

## 2024-01-23 PROCEDURE — 51701 INSERT BLADDER CATHETER: CPT

## 2024-01-23 PROCEDURE — 7210000100 HC LABOR FEE PER 1 HR

## 2024-01-23 PROCEDURE — 7220000101 HC DELIVERY VAGINAL/SINGLE

## 2024-01-23 PROCEDURE — 10907ZC DRAINAGE OF AMNIOTIC FLUID, THERAPEUTIC FROM PRODUCTS OF CONCEPTION, VIA NATURAL OR ARTIFICIAL OPENING: ICD-10-PCS | Performed by: OBSTETRICS & GYNECOLOGY

## 2024-01-23 PROCEDURE — 85025 COMPLETE CBC W/AUTO DIFF WBC: CPT

## 2024-01-23 PROCEDURE — 86900 BLOOD TYPING SEROLOGIC ABO: CPT

## 2024-01-23 PROCEDURE — 7100000011 HC PHASE II RECOVERY - ADDTL 15 MIN

## 2024-01-23 PROCEDURE — 00HU33Z INSERTION OF INFUSION DEVICE INTO SPINAL CANAL, PERCUTANEOUS APPROACH: ICD-10-PCS | Performed by: ANESTHESIOLOGY

## 2024-01-23 RX ORDER — DOCUSATE SODIUM 100 MG/1
100 CAPSULE, LIQUID FILLED ORAL 2 TIMES DAILY
Status: DISCONTINUED | OUTPATIENT
Start: 2024-01-23 | End: 2024-01-24 | Stop reason: HOSPADM

## 2024-01-23 RX ORDER — ONDANSETRON 8 MG/1
8 TABLET, ORALLY DISINTEGRATING ORAL EVERY 8 HOURS PRN
Status: DISCONTINUED | OUTPATIENT
Start: 2024-01-23 | End: 2024-01-24 | Stop reason: HOSPADM

## 2024-01-23 RX ORDER — METHYLERGONOVINE MALEATE 0.2 MG/ML
200 INJECTION INTRAVENOUS PRN
Status: DISCONTINUED | OUTPATIENT
Start: 2024-01-23 | End: 2024-01-24 | Stop reason: HOSPADM

## 2024-01-23 RX ORDER — FAMOTIDINE 20 MG/1
20 TABLET, FILM COATED ORAL 2 TIMES DAILY PRN
Status: DISCONTINUED | OUTPATIENT
Start: 2024-01-23 | End: 2024-01-24 | Stop reason: HOSPADM

## 2024-01-23 RX ORDER — TRANEXAMIC ACID 10 MG/ML
1000 INJECTION, SOLUTION INTRAVENOUS
Status: DISCONTINUED | OUTPATIENT
Start: 2024-01-23 | End: 2024-01-23

## 2024-01-23 RX ORDER — SODIUM CHLORIDE 0.9 % (FLUSH) 0.9 %
5-40 SYRINGE (ML) INJECTION EVERY 12 HOURS SCHEDULED
Status: DISCONTINUED | OUTPATIENT
Start: 2024-01-23 | End: 2024-01-23

## 2024-01-23 RX ORDER — SODIUM CHLORIDE 9 MG/ML
INJECTION, SOLUTION INTRAVENOUS PRN
Status: DISCONTINUED | OUTPATIENT
Start: 2024-01-23 | End: 2024-01-24 | Stop reason: HOSPADM

## 2024-01-23 RX ORDER — ACETAMINOPHEN 500 MG
1000 TABLET ORAL EVERY 6 HOURS PRN
Status: DISCONTINUED | OUTPATIENT
Start: 2024-01-23 | End: 2024-01-24 | Stop reason: HOSPADM

## 2024-01-23 RX ORDER — LANOLIN
CREAM (ML) TOPICAL PRN
Status: DISCONTINUED | OUTPATIENT
Start: 2024-01-23 | End: 2024-01-24 | Stop reason: HOSPADM

## 2024-01-23 RX ORDER — MISOPROSTOL 200 UG/1
800 TABLET ORAL PRN
Status: DISCONTINUED | OUTPATIENT
Start: 2024-01-23 | End: 2024-01-23

## 2024-01-23 RX ORDER — MISOPROSTOL 200 UG/1
200 TABLET ORAL PRN
Status: DISCONTINUED | OUTPATIENT
Start: 2024-01-23 | End: 2024-01-24 | Stop reason: HOSPADM

## 2024-01-23 RX ORDER — SODIUM CHLORIDE 9 MG/ML
INJECTION, SOLUTION INTRAVENOUS PRN
Status: DISCONTINUED | OUTPATIENT
Start: 2024-01-23 | End: 2024-01-23

## 2024-01-23 RX ORDER — ONDANSETRON 2 MG/ML
4 INJECTION INTRAMUSCULAR; INTRAVENOUS EVERY 6 HOURS PRN
Status: DISCONTINUED | OUTPATIENT
Start: 2024-01-23 | End: 2024-01-23

## 2024-01-23 RX ORDER — FAMOTIDINE 20 MG/1
20 TABLET, FILM COATED ORAL 2 TIMES DAILY PRN
Status: DISCONTINUED | OUTPATIENT
Start: 2024-01-23 | End: 2024-01-23

## 2024-01-23 RX ORDER — DEXTROSE, SODIUM CHLORIDE, SODIUM LACTATE, POTASSIUM CHLORIDE, AND CALCIUM CHLORIDE 5; .6; .31; .03; .02 G/100ML; G/100ML; G/100ML; G/100ML; G/100ML
INJECTION, SOLUTION INTRAVENOUS CONTINUOUS
Status: DISCONTINUED | OUTPATIENT
Start: 2024-01-23 | End: 2024-01-24 | Stop reason: HOSPADM

## 2024-01-23 RX ORDER — SODIUM CHLORIDE 0.9 % (FLUSH) 0.9 %
5-40 SYRINGE (ML) INJECTION PRN
Status: DISCONTINUED | OUTPATIENT
Start: 2024-01-23 | End: 2024-01-24 | Stop reason: HOSPADM

## 2024-01-23 RX ORDER — CARBOPROST TROMETHAMINE 250 UG/ML
250 INJECTION, SOLUTION INTRAMUSCULAR PRN
Status: DISCONTINUED | OUTPATIENT
Start: 2024-01-23 | End: 2024-01-23

## 2024-01-23 RX ORDER — SODIUM CHLORIDE 0.9 % (FLUSH) 0.9 %
5-40 SYRINGE (ML) INJECTION EVERY 12 HOURS SCHEDULED
Status: DISCONTINUED | OUTPATIENT
Start: 2024-01-23 | End: 2024-01-24 | Stop reason: HOSPADM

## 2024-01-23 RX ORDER — TERBUTALINE SULFATE 1 MG/ML
0.25 INJECTION, SOLUTION SUBCUTANEOUS ONCE
Status: DISCONTINUED | OUTPATIENT
Start: 2024-01-23 | End: 2024-01-23

## 2024-01-23 RX ORDER — SODIUM CHLORIDE, SODIUM LACTATE, POTASSIUM CHLORIDE, AND CALCIUM CHLORIDE .6; .31; .03; .02 G/100ML; G/100ML; G/100ML; G/100ML
500 INJECTION, SOLUTION INTRAVENOUS PRN
Status: DISCONTINUED | OUTPATIENT
Start: 2024-01-23 | End: 2024-01-23

## 2024-01-23 RX ORDER — LIDOCAINE HYDROCHLORIDE AND EPINEPHRINE 15; 5 MG/ML; UG/ML
INJECTION, SOLUTION EPIDURAL PRN
Status: DISCONTINUED | OUTPATIENT
Start: 2024-01-23 | End: 2024-01-23 | Stop reason: SDUPTHER

## 2024-01-23 RX ORDER — DEXTROSE, SODIUM CHLORIDE, SODIUM LACTATE, POTASSIUM CHLORIDE, AND CALCIUM CHLORIDE 5; .6; .31; .03; .02 G/100ML; G/100ML; G/100ML; G/100ML; G/100ML
INJECTION, SOLUTION INTRAVENOUS CONTINUOUS
Status: DISCONTINUED | OUTPATIENT
Start: 2024-01-23 | End: 2024-01-23

## 2024-01-23 RX ORDER — ROPIVACAINE HYDROCHLORIDE 2 MG/ML
INJECTION, SOLUTION EPIDURAL; INFILTRATION; PERINEURAL CONTINUOUS PRN
Status: DISCONTINUED | OUTPATIENT
Start: 2024-01-23 | End: 2024-01-23 | Stop reason: SDUPTHER

## 2024-01-23 RX ORDER — OXYCODONE HYDROCHLORIDE 5 MG/1
5 TABLET ORAL EVERY 4 HOURS PRN
Status: DISCONTINUED | OUTPATIENT
Start: 2024-01-23 | End: 2024-01-24 | Stop reason: HOSPADM

## 2024-01-23 RX ORDER — DOCUSATE SODIUM 100 MG/1
100 CAPSULE, LIQUID FILLED ORAL 2 TIMES DAILY
Status: DISCONTINUED | OUTPATIENT
Start: 2024-01-23 | End: 2024-01-23

## 2024-01-23 RX ORDER — OXYCODONE HYDROCHLORIDE 5 MG/1
10 TABLET ORAL EVERY 4 HOURS PRN
Status: DISCONTINUED | OUTPATIENT
Start: 2024-01-23 | End: 2024-01-24 | Stop reason: HOSPADM

## 2024-01-23 RX ORDER — SODIUM CHLORIDE, SODIUM LACTATE, POTASSIUM CHLORIDE, AND CALCIUM CHLORIDE .6; .31; .03; .02 G/100ML; G/100ML; G/100ML; G/100ML
1000 INJECTION, SOLUTION INTRAVENOUS PRN
Status: DISCONTINUED | OUTPATIENT
Start: 2024-01-23 | End: 2024-01-23

## 2024-01-23 RX ORDER — ROPIVACAINE HYDROCHLORIDE 2 MG/ML
INJECTION, SOLUTION EPIDURAL; INFILTRATION; PERINEURAL PRN
Status: DISCONTINUED | OUTPATIENT
Start: 2024-01-23 | End: 2024-01-23 | Stop reason: SDUPTHER

## 2024-01-23 RX ORDER — SIMETHICONE 80 MG
80 TABLET,CHEWABLE ORAL EVERY 6 HOURS PRN
Status: DISCONTINUED | OUTPATIENT
Start: 2024-01-23 | End: 2024-01-24 | Stop reason: HOSPADM

## 2024-01-23 RX ORDER — SODIUM CHLORIDE 0.9 % (FLUSH) 0.9 %
5-40 SYRINGE (ML) INJECTION PRN
Status: DISCONTINUED | OUTPATIENT
Start: 2024-01-23 | End: 2024-01-23

## 2024-01-23 RX ORDER — IBUPROFEN 800 MG/1
800 TABLET ORAL EVERY 8 HOURS SCHEDULED
Status: DISCONTINUED | OUTPATIENT
Start: 2024-01-23 | End: 2024-01-24 | Stop reason: HOSPADM

## 2024-01-23 RX ORDER — METHYLERGONOVINE MALEATE 0.2 MG/ML
200 INJECTION INTRAVENOUS PRN
Status: DISCONTINUED | OUTPATIENT
Start: 2024-01-23 | End: 2024-01-23

## 2024-01-23 RX ADMIN — ROPIVACAINE HYDROCHLORIDE 10 ML/HR: 2 INJECTION, SOLUTION EPIDURAL; INFILTRATION; PERINEURAL at 14:15

## 2024-01-23 RX ADMIN — SODIUM CHLORIDE, SODIUM LACTATE, POTASSIUM CHLORIDE, CALCIUM CHLORIDE AND DEXTROSE MONOHYDRATE: 5; 600; 310; 30; 20 INJECTION, SOLUTION INTRAVENOUS at 12:04

## 2024-01-23 RX ADMIN — SODIUM CHLORIDE, POTASSIUM CHLORIDE, SODIUM LACTATE AND CALCIUM CHLORIDE 1000 ML: 600; 310; 30; 20 INJECTION, SOLUTION INTRAVENOUS at 13:58

## 2024-01-23 RX ADMIN — OXYTOCIN 2 MILLI-UNITS/MIN: 10 INJECTION INTRAVENOUS at 12:20

## 2024-01-23 RX ADMIN — LIDOCAINE HYDROCHLORIDE,EPINEPHRINE BITARTRATE 3 ML: 15; .005 INJECTION, SOLUTION EPIDURAL; INFILTRATION; INTRACAUDAL; PERINEURAL at 14:10

## 2024-01-23 RX ADMIN — IBUPROFEN 800 MG: 800 TABLET, FILM COATED ORAL at 21:59

## 2024-01-23 RX ADMIN — Medication 166.7 ML: at 17:49

## 2024-01-23 RX ADMIN — SODIUM CHLORIDE, PRESERVATIVE FREE 5 ML: 5 INJECTION INTRAVENOUS at 22:00

## 2024-01-23 RX ADMIN — OXYCODONE 10 MG: 5 TABLET ORAL at 20:07

## 2024-01-23 RX ADMIN — SODIUM CHLORIDE 5 MILLION UNITS: 900 INJECTION INTRAVENOUS at 12:07

## 2024-01-23 RX ADMIN — ROPIVACAINE HYDROCHLORIDE 5 ML: 2 INJECTION, SOLUTION EPIDURAL; INFILTRATION; PERINEURAL at 14:16

## 2024-01-23 RX ADMIN — SODIUM CHLORIDE 2.5 MILLION UNITS: 9 INJECTION, SOLUTION INTRAVENOUS at 16:07

## 2024-01-23 RX ADMIN — ACETAMINOPHEN 1000 MG: 500 TABLET, FILM COATED ORAL at 19:09

## 2024-01-23 RX ADMIN — ROPIVACAINE HYDROCHLORIDE 5 ML: 2 INJECTION, SOLUTION EPIDURAL; INFILTRATION; PERINEURAL at 14:14

## 2024-01-23 RX ADMIN — Medication: at 20:37

## 2024-01-23 ASSESSMENT — PAIN DESCRIPTION - LOCATION
LOCATION: PERINEUM
LOCATION: ABDOMEN
LOCATION: ABDOMEN

## 2024-01-23 ASSESSMENT — PAIN DESCRIPTION - DESCRIPTORS
DESCRIPTORS: CRAMPING
DESCRIPTORS: DISCOMFORT
DESCRIPTORS: CRAMPING

## 2024-01-23 ASSESSMENT — PAIN SCALES - GENERAL
PAINLEVEL_OUTOF10: 8
PAINLEVEL_OUTOF10: 6

## 2024-01-23 NOTE — ANESTHESIA PROCEDURE NOTES
Epidural Block    Patient location during procedure: OB  Start time: 1/23/2024 2:07 PM  End time: 1/23/2024 2:09 PM  Reason for block: labor epidural  Staffing  Performed: anesthesiologist   Anesthesiologist: Angel Rahman DO  Performed by: Angel Rahman DO  Authorized by: Angel Rahman DO    Epidural  Patient position: sitting  Prep: ChloraPrep  Patient monitoring: cardiac monitor, continuous pulse ox and frequent blood pressure checks  Approach: midline  Location: L3-4  Injection technique: RASTA saline  Provider prep: mask and sterile gloves  Needle  Needle type: Tuohy   Needle gauge: 17 G  Needle insertion depth: 5 cm  Catheter type: end hole  Catheter size: 19 G  Catheter at skin depth: 11 cm  Test dose: negativeCatheter Secured: tegaderm and tape  Assessment  Hemodynamics: stable  Attempts: 1  Outcomes: uncomplicated and patient tolerated procedure well  Additional Notes  R/B/I of labor epidural discussed.  Pt expresses understanding and is willing to proceed    Timeout performed    In the sitting position, lidocaine used to anesthetize the skin.  Tuohy needle inserted until RASTA to saline occurred.  Catheter threaded without difficulty or paresthesia.  Negative aspiration for CSF/blood.  Pt tolerated the procedure well  Preanesthetic Checklist  Completed: patient identified, IV checked, site marked, risks and benefits discussed, surgical/procedural consents, equipment checked, pre-op evaluation, timeout performed, anesthesia consent given, oxygen available and monitors applied/VS acknowledged

## 2024-01-23 NOTE — H&P
OB L&D H&P  History and Physical    HPI: Erendira Marmolejo is a 33 y.o.  at 39w1d for Estimated Date of Delivery: 24, presents for term IOL. Reports irregular contractions    PNC with Dr. Mancia, [facility], complicated by:  1. Grand multiparity  2. GBS bacteriuria- NKA  3. Hx of PTL x3 35-36 weeks  4. Hx of STDs- SAUL negative  5. Right nephrolithiasis-Patient was recently admitted for pyelonephritis and found to have nephro lithiasis     Past Medical History:   Diagnosis Date    Acute UTI 2021    Chlamydia 2019    Chronic anemia 2021    Hypokalemia 2021    Migraine with aura 2021    Right nephrolithiasis 2021    Trichimoniasis 2023    treated by ED      Past Surgical History:   Procedure Laterality Date    DILATION AND CURETTAGE OF UTERUS      SAB    ECTOPIC PREGNANCY SURGERY      unsure of side and if tube was removed    WISDOM TOOTH EXTRACTION       @OBH@  Social History     Socioeconomic History    Marital status: Single     Spouse name: Not on file    Number of children: Not on file    Years of education: Not on file    Highest education level: Not on file   Occupational History    Not on file   Tobacco Use    Smoking status: Never    Smokeless tobacco: Never   Vaping Use    Vaping Use: Never used   Substance and Sexual Activity    Alcohol use: Not Currently    Drug use: Never    Sexual activity: Yes     Partners: Male   Other Topics Concern    Not on file   Social History Narrative    Not on file     Social Determinants of Health     Financial Resource Strain: Patient Declined (2023)    Overall Financial Resource Strain (CARDIA)     Difficulty of Paying Living Expenses: Patient declined   Food Insecurity: Not on file (2023)   Transportation Needs: Unknown (2023)    PRAPARE - Transportation     Lack of Transportation (Medical): Not on file     Lack of Transportation (Non-Medical): Patient declined   Physical Activity: Not on file   Stress: Not on file   Social

## 2024-01-23 NOTE — ANESTHESIA PRE PROCEDURE
Department of Anesthesiology  Preprocedure Note       Name:  Erendira Marmolejo   Age:  33 y.o.  :  1990                                          MRN:  [id number]         Date:  2024      Surgeon: * No surgeons listed *    Procedure: * No procedures listed *    Medications prior to admission:   Prior to Admission medications    Medication Sig Start Date End Date Taking? Authorizing Provider   ferrous sulfate (IRON 325) 325 (65 Fe) MG tablet Take 1 tablet by mouth daily (with breakfast)    Andry Stacy MD   magnesium oxide (MAG-OX) 400 MG tablet Take 1 tablet by mouth 2 times daily 23   Jocelyne Mancia DO   Prenatal Vit-Fe Fumarate-FA (PRENATAL PO) Take by mouth    ProviderAndry MD       Current medications:    Current Facility-Administered Medications   Medication Dose Route Frequency Provider Last Rate Last Admin    dextrose 5 % in lactated ringers infusion   IntraVENous Continuous Muna Diego  mL/hr at 24 1520 Rate Verify at 24 1520    lactated ringers bolus bolus 500 mL  500 mL IntraVENous PRN Muna Diego MD        Or    lactated ringers bolus bolus 1,000 mL  1,000 mL IntraVENous PRN Muna Diego MD   Stopped at 24 1452    sodium chloride flush 0.9 % injection 5-40 mL  5-40 mL IntraVENous 2 times per day Muna Diego MD        sodium chloride flush 0.9 % injection 5-40 mL  5-40 mL IntraVENous PRN Muna Diego MD        0.9 % sodium chloride infusion   IntraVENous PRN Muna Diego MD        methylergonovine (METHERGINE) injection 200 mcg  200 mcg IntraMUSCular PRN Muna Diego MD        carboprost (HEMABATE) injection 250 mcg  250 mcg IntraMUSCular PRN Muna Diego MD        miSOPROStol (CYTOTEC) tablet 800 mcg  800 mcg Rectal PRN Muna Diego MD        tranexamic acid-NaCl IVPB premix 1,000 mg  1,000 mg IntraVENous Once PRN Muna Diego

## 2024-01-23 NOTE — L&D DELIVERY NOTE
At 17:46, this 33 y.o.  at 39w1d delivered a vigorous female  infant from vtx presentation to a sterile field under epidural anesthesia. Cord clamped x 2, cut. Infant bulb suctioned and put to mother's chest. APGARs 9 at 1 minute and 9 at 5 minutes. 3VC/SI placenta delivered. 30U Pitocin given IV. Fundus firm. Bleeding scant.  1st degree right periurethral laceration repaired in normal fashion with 3-0 vicryl. No packs in vagina. EBL 100cc. Mom and infant stable in LDR.     Staff: Dr. Rocha, Girl Erendira [[id number]]      Labor Events     Labor: No   Steroids: Full Course  Cervical Ripening Date/Time:      Antibiotics Received during Labor: Yes  Rupture Date/Time:  24 13:45:00   Rupture Type: AROM, Intact  Fluid Color: Clear  Induction: AROM, Oxytocin  Labor Complications: None              Anesthesia    Method: Epidural       Labor Event Times      Labor onset date/time:        Dilation complete date/time:  24 17:34:00     Start pushing date/time:  2024 17:41:00   Decision date/time (emergent ):            Labor Length    2nd stage: 0h 12m  3rd stage: 0h 04m       Delivery Details      Delivery Date: 24 Delivery Time: 17:46:00   Delivery Type: Vaginal, Spontaneous               Presentation    Presentation: Vertex       Shoulder Dystocia    Shoulder Dystocia Present?: No       Assisted Delivery Details    Forceps Attempted?: No  Vacuum Extractor Attempted?: No                           Cord    Vessels: 3 Vessels  Complications: None  Delayed Cord Clamping?: Yes  Cord Clamped Date/Time: 2024 17:47:18  Cord Blood Disposition: Lab  Gases Sent?: No              Placenta    Date/Time: 2024 17:50:00  Removal: Spontaneous  Appearance: Intact  Disposition: Discarded       Lacerations    Episiotomy: None  Perineal Lacerations: None  Other Lacerations: periurethral laceration  Periurethral Laceration: Right Repaired?: Yes   Number of

## 2024-01-23 NOTE — PROGRESS NOTES
Labor progress note  Pt resting comfortably without complaints    Cvx: /-2  FHTs: category 1    Pitocin at 2mu/min     AROM with clear fluid    Patient Vitals for the past 8 hrs:   BP Temp Temp src Pulse Resp SpO2   24 1305 107/67 -- -- 76 -- --   24 1235 108/76 -- -- 90 -- --   24 1148 -- -- -- 86 -- 98 %   24 1137 118/73 98.2 °F (36.8 °C) Oral 90 17 98 %       Plan   Continue augmentation   Will increase pit accordingly pending fetal tolerance  Anticipate

## 2024-01-24 VITALS
DIASTOLIC BLOOD PRESSURE: 73 MMHG | TEMPERATURE: 97.7 F | SYSTOLIC BLOOD PRESSURE: 117 MMHG | OXYGEN SATURATION: 95 % | RESPIRATION RATE: 18 BRPM | HEART RATE: 85 BPM

## 2024-01-24 LAB — HGB BLD-MCNC: 11 G/DL (ref 11.7–15.4)

## 2024-01-24 PROCEDURE — 85018 HEMOGLOBIN: CPT

## 2024-01-24 PROCEDURE — 6370000000 HC RX 637 (ALT 250 FOR IP): Performed by: OBSTETRICS & GYNECOLOGY

## 2024-01-24 PROCEDURE — 36415 COLL VENOUS BLD VENIPUNCTURE: CPT

## 2024-01-24 RX ORDER — IBUPROFEN 800 MG/1
800 TABLET ORAL EVERY 8 HOURS PRN
Qty: 100 TABLET | Refills: 0 | Status: SHIPPED | OUTPATIENT
Start: 2024-01-24

## 2024-01-24 RX ORDER — OXYCODONE HYDROCHLORIDE 5 MG/1
5 TABLET ORAL EVERY 8 HOURS PRN
Qty: 6 TABLET | Refills: 0 | Status: SHIPPED | OUTPATIENT
Start: 2024-01-24 | End: 2024-01-31

## 2024-01-24 RX ADMIN — OXYCODONE 10 MG: 5 TABLET ORAL at 18:55

## 2024-01-24 RX ADMIN — OXYCODONE 10 MG: 5 TABLET ORAL at 04:07

## 2024-01-24 RX ADMIN — ACETAMINOPHEN 1000 MG: 500 TABLET, FILM COATED ORAL at 01:21

## 2024-01-24 RX ADMIN — ACETAMINOPHEN 1000 MG: 500 TABLET, FILM COATED ORAL at 16:12

## 2024-01-24 RX ADMIN — OXYCODONE 10 MG: 5 TABLET ORAL at 00:06

## 2024-01-24 RX ADMIN — IBUPROFEN 800 MG: 800 TABLET, FILM COATED ORAL at 13:52

## 2024-01-24 RX ADMIN — ACETAMINOPHEN 1000 MG: 500 TABLET, FILM COATED ORAL at 08:28

## 2024-01-24 RX ADMIN — OXYCODONE 10 MG: 5 TABLET ORAL at 12:29

## 2024-01-24 RX ADMIN — IBUPROFEN 800 MG: 800 TABLET, FILM COATED ORAL at 06:35

## 2024-01-24 RX ADMIN — DOCUSATE SODIUM 100 MG: 100 CAPSULE, LIQUID FILLED ORAL at 08:28

## 2024-01-24 ASSESSMENT — PAIN SCALES - GENERAL
PAINLEVEL_OUTOF10: 7
PAINLEVEL_OUTOF10: 2
PAINLEVEL_OUTOF10: 4
PAINLEVEL_OUTOF10: 7
PAINLEVEL_OUTOF10: 6
PAINLEVEL_OUTOF10: 7
PAINLEVEL_OUTOF10: 4
PAINLEVEL_OUTOF10: 4
PAINLEVEL_OUTOF10: 8

## 2024-01-24 ASSESSMENT — PAIN DESCRIPTION - DESCRIPTORS
DESCRIPTORS: CRAMPING
DESCRIPTORS: CRAMPING;DISCOMFORT
DESCRIPTORS: CRAMPING
DESCRIPTORS: ACHING;CRAMPING
DESCRIPTORS: CRAMPING;DISCOMFORT;BURNING
DESCRIPTORS: CRAMPING
DESCRIPTORS: CRAMPING

## 2024-01-24 ASSESSMENT — PAIN DESCRIPTION - ORIENTATION
ORIENTATION: ANTERIOR;LOWER

## 2024-01-24 ASSESSMENT — PAIN DESCRIPTION - LOCATION
LOCATION: ABDOMEN
LOCATION: ABDOMEN;PERINEUM
LOCATION: ABDOMEN
LOCATION: ABDOMEN
LOCATION: ABDOMEN;PERINEUM
LOCATION: ABDOMEN;PERINEUM
LOCATION: ABDOMEN
LOCATION: ABDOMEN

## 2024-01-24 ASSESSMENT — PAIN - FUNCTIONAL ASSESSMENT
PAIN_FUNCTIONAL_ASSESSMENT: ACTIVITIES ARE NOT PREVENTED

## 2024-01-24 NOTE — PROGRESS NOTES
[facility], [facility].             January 24, 2024             To Whom it May Concern:        This is to certify that Phoenix Fielder has been in the hospital for the birth of his child from 1/23/2024 to 1/25/2024.      Sincerely,    Daily Tong RN

## 2024-01-24 NOTE — DISCHARGE INSTRUCTIONS
signs of infection, such as:  A fever.  Frequent or painful urination or blood in your urine.  Vaginal discharge that smells bad.  New or worse belly pain.     You have symptoms of a blood clot in your leg (called a deep vein thrombosis), such as:  Pain in the calf, back of the knee, thigh, or groin.  Swelling in the leg or groin.  A color change on the leg or groin. The skin may be reddish or purplish, depending on your usual skin color.     You have signs of preeclampsia, such as:  Sudden swelling of your face, hands, or feet.  New vision problems (such as dimness, blurring, or seeing spots).  A severe headache.     You have signs of heart failure, such as:  New or increased shortness of breath.  New or worse swelling in your legs, ankles, or feet.  Sudden weight gain, such as more than 2 to 3 pounds in a day or 5 pounds in a week.  Feeling so tired or weak that you cannot do your usual activities.     You had spinal or epidural pain relief and have:  New or worse back pain.  Increased pain, swelling, warmth, or redness at the injection site.  Tingling, weakness, or numbness in your legs or groin.   Watch closely for changes in your health, and be sure to contact your doctor if:    Your vaginal bleeding isn't decreasing.     You feel sad, anxious, or hopeless for more than a few days.     You are having problems with your breasts or breastfeeding.   Where can you learn more?  Go to https://www.[employer].net/patientEd and enter Q237 to learn more about \"Vaginal Childbirth: Care Instructions.\"  Current as of: July 11, 2023               Content Version: 13.9  © 2979-8454 [employer].   Care instructions adapted under license by [employer]. If you have questions about a medical condition or this instruction, always ask your healthcare professional. [employer] disclaims any warranty or liability for your use of this information.

## 2024-01-24 NOTE — PLAN OF CARE
Problem: Vaginal Birth or  Section  Goal: Fetal and maternal status remain reassuring during the birth process  Description:  Birth OB-Pregnancy care plan goal which identifies if the fetal and maternal status remain reassuring during the birth process  2024 by Ginette Scherer RN  Outcome: Progressing  2024 1158 by Josseline Croft RN  Outcome: Progressing     Problem: Postpartum  Goal: Experiences normal postpartum course  Description:  Postpartum OB-Pregnancy care plan goal which identifies if the mother is experiencing a normal postpartum course  2024 214 by Ginette Scherer RN  Outcome: Progressing  2024 1158 by Josseline Croft RN  Outcome: Progressing  Goal: Appropriate maternal -  bonding  Description:  Postpartum OB-Pregnancy care plan goal which identifies if the mother and  are bonding appropriately  2024 by Ginette Scherer RN  Outcome: Progressing  2024 1158 by Josseline Croft RN  Outcome: Progressing  Goal: Establishment of infant feeding pattern  Description:  Postpartum OB-Pregnancy care plan goal which identifies if the mother is establishing a feeding pattern with their   2024 by Ginette Scherer RN  Outcome: Progressing  2024 1158 by Josseline Croft RN  Outcome: Progressing  Goal: Incisions, wounds, or drain sites healing without S/S of infection  2024 by Ginette Scherer RN  Outcome: Progressing  Flowsheets (Taken 2024)  Incisions, Wounds, or Drain Sites Healing Without Sign and Symptoms of Infection:   TWICE DAILY: Assess and document skin integrity   TWICE DAILY: Assess and document dressing/incision, wound bed, drain sites and surrounding tissue   Implement wound care per orders   Initiate isolation precautions as appropriate  2024 1158 by Josseline Croft RN  Outcome: Progressing     Problem: Pain  Goal: Verbalizes/displays adequate comfort level or baseline comfort

## 2024-01-24 NOTE — ANESTHESIA POSTPROCEDURE EVALUATION
Department of Anesthesiology  Postprocedure Note    Patient: Erendira Marmolejo  MRN: [id number]  YOB: 1990  Date of evaluation: 1/23/2024    Procedure Summary       Date: 01/23/24 Room / Location:     Anesthesia Start: 1403 Anesthesia Stop: 1746    Procedure: Labor Analgesia Diagnosis:     Scheduled Providers:  Responsible Provider: Angel Rahman DO    Anesthesia Type: epidural ASA Status: 2            Anesthesia Type: No value filed.    David Phase I:      David Phase II:      Anesthesia Post Evaluation    Patient location during evaluation: floor  Level of consciousness: awake and alert  Airway patency: patent  Nausea & Vomiting: no nausea  Cardiovascular status: hemodynamically stable  Respiratory status: acceptable  Hydration status: euvolemic  Comments: Pt with no sig numbness noted. Still not voided/ambulated.  Overall doing well  Pain management: satisfactory to patient    No notable events documented.

## 2024-01-24 NOTE — DISCHARGE SUMMARY
Discharge Summary     Date of Admission:  2024 11:20 AM  Date of Discharge:  2024       Erendira Marmolejo 33 y.o.  presented at 39w1d for induction/in active labor.  Pt had  without incident.  See delivery note for all delivery details.  Pt's PP course was uneventful.  On day of D/C, she was ambulating well, afebrile, with lochia < menses.  She was discharged home with medications as below.  Pt was bottle feeding on discharge.  She plans on unsure for birth control.  HTN diagnosis: no   Routine PP instructions given to patient.  She is to follow up with [facility] in 6 weeks for PP exam.    No problem-specific Assessment & Plan notes found for this encounter.          Medication List        START taking these medications      ibuprofen 800 MG tablet  Commonly known as: ADVIL;MOTRIN  Take 1 tablet by mouth every 8 hours as needed for Pain     oxyCODONE 5 MG immediate release tablet  Commonly known as: ROXICODONE  Take 1 tablet by mouth every 8 hours as needed for Pain for up to 7 days. Max Daily Amount: 15 mg            CONTINUE taking these medications      ferrous sulfate 325 (65 Fe) MG tablet  Commonly known as: IRON 325     magnesium oxide 400 MG tablet  Commonly known as: MAG-OX  Take 1 tablet by mouth 2 times daily     PRENATAL PO               Where to Get Your Medications        These medications were sent to [facility]/pharmacy #[id number] - [place] - [address] [facility] [phone] - F [phone]  [address] [place] [id number]      Phone: [phone]   ibuprofen 800 MG tablet  oxyCODONE 5 MG immediate release tablet         Nadia Pérez MD  10:32 AM  24

## 2024-01-24 NOTE — CARE COORDINATION
Chart reviewed - no needs identified.  SW met with patient to complete initial assessment.    Patient denies any history of postpartum depression/anxiety.    Patient given informational packet on  mood & anxiety disorders (resources/education).    Family denies any additional needs from  at this time.  Family has 's contact information should any needs/questions arise.    Ijeoma Quan, SHAISTA-KATLIN, PM-C  [facility]   [phone]

## 2024-01-24 NOTE — PROGRESS NOTES
Post-Partum Day Number 1 Progress Note  Erendira Marmolejo  [id number]    Patient doing well post-partum without significant complaint.  Voiding without difficulty, normal lochia.    Vitals:  Patient Vitals for the past 24 hrs:   BP Temp Temp src Pulse Resp SpO2   01/24/24 0719 112/72 97.9 °F (36.6 °C) Oral 84 18 95 %   01/24/24 0407 116/83 97.9 °F (36.6 °C) Oral 75 18 98 %   01/24/24 0006 112/79 98.2 °F (36.8 °C) Oral 77 16 97 %   01/23/24 2007 128/85 98 °F (36.7 °C) Oral 69 18 98 %   01/23/24 1938 127/75 -- -- 63 -- --   01/23/24 1921 (!) 111/59 98.2 °F (36.8 °C) Oral 71 16 98 %   01/23/24 1906 (!) 117/59 -- -- 60 -- --   01/23/24 1851 126/73 97.6 °F (36.4 °C) Oral 61 -- --   01/23/24 1836 118/79 -- -- (!) 106 -- --   01/23/24 1821 113/69 -- -- 77 -- --   01/23/24 1805 128/77 -- -- 73 -- --   01/23/24 1751 125/76 97.8 °F (36.6 °C) Oral 72 -- --   01/23/24 1737 114/74 -- -- 77 -- --   01/23/24 1721 118/75 -- -- 76 -- --   01/23/24 1706 (!) 129/90 -- -- 69 -- --   01/23/24 1651 127/82 -- -- 70 -- --   01/23/24 1621 110/63 -- -- 73 -- --   01/23/24 1607 111/68 -- -- 71 -- --   01/23/24 1556 -- 97.7 °F (36.5 °C) Oral -- -- --   01/23/24 1552 109/64 -- -- 71 -- --   01/23/24 1536 112/61 -- -- 71 -- --   01/23/24 1521 107/62 -- -- 71 -- --   01/23/24 1507 111/73 -- -- 75 -- --   01/23/24 1448 115/60 -- -- 71 -- --   01/23/24 1444 (!) 122/58 -- -- 75 -- --   01/23/24 1438 (!) 119/57 -- -- 76 -- --   01/23/24 1434 (!) 120/59 -- -- 76 -- --   01/23/24 1425 111/62 -- -- 70 -- --   01/23/24 1423 (!) 112/58 -- -- 75 -- --   01/23/24 1421 (!) 109/59 -- -- 70 -- --   01/23/24 1420 (!) 110/58 -- -- 73 -- --   01/23/24 1418 113/64 -- -- 75 -- --   01/23/24 1416 114/66 -- -- 71 -- --   01/23/24 1415 122/66 -- -- 81 -- --   01/23/24 1414 (!) 134/91 -- -- 98 -- --   01/23/24 1412 132/87 -- -- 82 -- --   01/23/24 1410 133/86 -- -- 85 -- --   01/23/24 1405 137/84 97.8 °F (36.6 °C) Oral 96 -- --   01/23/24 1336 103/61 -- -- 76 -- --

## 2024-03-04 ENCOUNTER — POSTPARTUM VISIT (OUTPATIENT)
Dept: OBGYN CLINIC | Age: 34
End: 2024-03-04
Payer: COMMERCIAL

## 2024-03-04 VITALS
BODY MASS INDEX: 27.49 KG/M2 | WEIGHT: 161 LBS | HEIGHT: 64 IN | SYSTOLIC BLOOD PRESSURE: 122 MMHG | DIASTOLIC BLOOD PRESSURE: 86 MMHG

## 2024-03-04 PROBLEM — R82.71 GBS BACTERIURIA: Status: RESOLVED | Noted: 2023-06-28 | Resolved: 2024-03-04

## 2024-03-04 PROBLEM — Z34.90 ENCOUNTER FOR ELECTIVE INDUCTION OF LABOR: Status: RESOLVED | Noted: 2023-07-19 | Resolved: 2024-03-04

## 2024-03-04 PROBLEM — Z34.91 ENCOUNTER FOR SUPERVISION OF LOW-RISK PREGNANCY IN FIRST TRIMESTER: Status: RESOLVED | Noted: 2023-06-28 | Resolved: 2024-03-04

## 2024-03-04 PROBLEM — Z87.51 HISTORY OF PRETERM LABOR: Status: RESOLVED | Noted: 2023-06-28 | Resolved: 2024-03-04

## 2024-03-04 RX ORDER — MEDROXYPROGESTERONE ACETATE 150 MG/ML
150 INJECTION, SUSPENSION INTRAMUSCULAR
Qty: 1 ML | Refills: 3 | Status: SHIPPED | OUTPATIENT
Start: 2024-03-04

## 2024-03-04 NOTE — PROGRESS NOTES
6 week Postpartum Office Visit    Erendira Marmolejo is a 34 y.o.  that presents for PP visit today    Birth Control: none    Breast/Bottle: bottle    Bleeding: On cycle now    Baby: Doing well    Bowel/Bladder: No issues    Blues: None    Last pap:       OB History    Para Term  AB Living   7 5 2 3 2 5   SAB IAB Ectopic Molar Multiple Live Births   1   1   0 1      # Outcome Date GA Lbr Christopher/2nd Weight Sex Delivery Anes PTL Lv   7 Term 24 39w1d / 00:12 2.76 kg (6 lb 1.4 oz) F Vag-Spont EPI N ANGELINA   6  17 35w0d  2.722 kg (6 lb) M Vag-Spont         Complications:  Labor, Gestational diabetes,  delivery   5  02/06/15 36w0d  2.381 kg (5 lb 4 oz) F Vag-Spont         Complications:  Labor, Gestational diabetes,  delivery   4  07/15/12 35w0d  2.268 kg (5 lb) M Vag-Spont         Complications:  Labor, Gestational diabetes,  delivery   3 SAB               Birth Comments: D&C   2 Ectopic 2012           1 Term 11 37w0d  3.402 kg (7 lb 8 oz) M Vag-Spont         Complications: Gestational diabetes        Past Medical History:   Diagnosis Date    Acute UTI 2021    Chlamydia 2019    Chronic anemia 2021    Hypokalemia 2021    Migraine with aura 2021    Right nephrolithiasis 2021    Trichimoniasis 2023    treated by ED       Past Surgical History:   Procedure Laterality Date    DILATION AND CURETTAGE OF UTERUS      SAB    ECTOPIC PREGNANCY SURGERY  2012    unsure of side and if tube was removed    WISDOM TOOTH EXTRACTION          Social History     Socioeconomic History    Marital status: Single     Spouse name: Not on file    Number of children: Not on file    Years of education: Not on file    Highest education level: Not on file   Occupational History    Not on file   Tobacco Use    Smoking status: Never    Smokeless tobacco: Never   Vaping Use    Vaping Use: Never used   Substance and Sexual Activity

## 2024-03-05 ENCOUNTER — CLINICAL DOCUMENTATION (OUTPATIENT)
Dept: OBGYN CLINIC | Age: 34
End: 2024-03-05

## 2024-03-05 NOTE — PROGRESS NOTES
Pt presents today for depo injection. Medication supplied by patient.   Last Depo-Provera: n/a.  Side Effects if any: n/a.  Serum HCG indicated? no.    Depo-Provera 150 mg IM given by: MAXIMO Greenwood RN.  Dose - 150 mg  Injection Site - R deltoid   - [employer]  Lot # - [id number]  Expiration - 10/31/24  NDC - 99780-572-82    Pt tolerated well     Next appointment due between 05/21/24-06/04/24.

## 2024-05-21 ENCOUNTER — CLINICAL DOCUMENTATION (OUTPATIENT)
Dept: OBGYN CLINIC | Age: 34
End: 2024-05-21

## 2024-05-21 NOTE — PROGRESS NOTES
Pt presents today for depo injection. Medication supplied by patient.   Last Depo-Provera: 03/05/24.  Side Effects if any: none.  Serum HCG indicated? no.    Depo-Provera 150 mg IM given by: MAXIMO Greenwood RN.  Injection Site - L dorsogluteal   - [employer]  Lot # - [id number]  Expiration - 02/29/28  NDC - 01381-752-25    Pt tolerated well     Next appointment due between 08/06/24-08/20/24.

## 2024-08-06 ENCOUNTER — CLINICAL DOCUMENTATION (OUTPATIENT)
Dept: OBGYN CLINIC | Age: 34
End: 2024-08-06

## 2024-08-06 NOTE — PROGRESS NOTES
Pt presents today for depo injection. Medication supplied by patient.   Last Depo-Provera: 05/21/24.  Side Effects if any: none.  Serum HCG indicated? no.    Depo-Provera 150 mg IM given by: MAXIMO Greenwood RN.  Injection Site - R dorsogluteal   - [employer]  Lot # - [id number]  Expiration - 11/30/28  NDC - 98196-542-63    Pt tolerated well     Next appointment due between 10/22/24-11/05/24.

## 2024-10-22 ENCOUNTER — CLINICAL DOCUMENTATION (OUTPATIENT)
Dept: OBGYN CLINIC | Age: 34
End: 2024-10-22

## 2024-10-22 NOTE — PROGRESS NOTES
Pt presents today for depo injection. Medication supplied by patient.   Last Depo-Provera: 8/6/24.  Side Effects if any: N/A.  Serum HCG indicated? N/A.    Depo-Provera 150 mg IM given by: LN.  Dose - 150mg  Injection Site - LG   - [employer]  Lot # - [id number]  Expiration - 7/31/2027  NDC - 31776-371-27    Pt tolerated well     Next appointment due between 1/7/25-1/21/25.

## 2025-01-07 ENCOUNTER — PATIENT MESSAGE (OUTPATIENT)
Dept: OBGYN CLINIC | Age: 35
End: 2025-01-07

## 2025-01-07 DIAGNOSIS — Z30.09 FAMILY PLANNING: Primary | ICD-10-CM

## 2025-01-07 RX ORDER — MEDROXYPROGESTERONE ACETATE 150 MG/ML
150 INJECTION, SUSPENSION INTRAMUSCULAR
Qty: 1 ML | Refills: 0 | Status: SHIPPED | OUTPATIENT
Start: 2025-01-07 | End: 2025-01-08 | Stop reason: SDUPTHER

## 2025-01-07 RX ORDER — MEDROXYPROGESTERONE ACETATE 150 MG/ML
150 INJECTION, SUSPENSION INTRAMUSCULAR
Qty: 1 ML | Refills: 0 | Status: SHIPPED | OUTPATIENT
Start: 2025-01-07 | End: 2025-01-07 | Stop reason: SDUPTHER

## 2025-01-08 RX ORDER — MEDROXYPROGESTERONE ACETATE 150 MG/ML
150 INJECTION, SUSPENSION INTRAMUSCULAR
Qty: 1 ML | Refills: 0 | Status: SHIPPED | OUTPATIENT
Start: 2025-01-08

## 2025-03-10 SDOH — ECONOMIC STABILITY: INCOME INSECURITY: IN THE LAST 12 MONTHS, WAS THERE A TIME WHEN YOU WERE NOT ABLE TO PAY THE MORTGAGE OR RENT ON TIME?: YES

## 2025-03-10 SDOH — ECONOMIC STABILITY: FOOD INSECURITY: WITHIN THE PAST 12 MONTHS, YOU WORRIED THAT YOUR FOOD WOULD RUN OUT BEFORE YOU GOT MONEY TO BUY MORE.: NEVER TRUE

## 2025-03-10 SDOH — ECONOMIC STABILITY: TRANSPORTATION INSECURITY
IN THE PAST 12 MONTHS, HAS LACK OF TRANSPORTATION KEPT YOU FROM MEETINGS, WORK, OR FROM GETTING THINGS NEEDED FOR DAILY LIVING?: NO

## 2025-03-10 SDOH — ECONOMIC STABILITY: FOOD INSECURITY: WITHIN THE PAST 12 MONTHS, THE FOOD YOU BOUGHT JUST DIDN'T LAST AND YOU DIDN'T HAVE MONEY TO GET MORE.: NEVER TRUE

## 2025-03-10 SDOH — ECONOMIC STABILITY: TRANSPORTATION INSECURITY
IN THE PAST 12 MONTHS, HAS THE LACK OF TRANSPORTATION KEPT YOU FROM MEDICAL APPOINTMENTS OR FROM GETTING MEDICATIONS?: NO

## 2025-03-11 ENCOUNTER — OFFICE VISIT (OUTPATIENT)
Dept: OBGYN CLINIC | Age: 35
End: 2025-03-11
Payer: MEDICAID

## 2025-03-11 VITALS
DIASTOLIC BLOOD PRESSURE: 74 MMHG | WEIGHT: 162 LBS | BODY MASS INDEX: 27.66 KG/M2 | SYSTOLIC BLOOD PRESSURE: 122 MMHG | HEIGHT: 64 IN

## 2025-03-11 DIAGNOSIS — Z01.419 WELL WOMAN EXAM WITH ROUTINE GYNECOLOGICAL EXAM: ICD-10-CM

## 2025-03-11 DIAGNOSIS — Z11.3 SCREEN FOR STD (SEXUALLY TRANSMITTED DISEASE): Primary | ICD-10-CM

## 2025-03-11 DIAGNOSIS — R30.0 DYSURIA: ICD-10-CM

## 2025-03-11 DIAGNOSIS — Z30.09 FAMILY PLANNING: ICD-10-CM

## 2025-03-11 PROCEDURE — 99459 PELVIC EXAMINATION: CPT | Performed by: OBSTETRICS & GYNECOLOGY

## 2025-03-11 PROCEDURE — 99395 PREV VISIT EST AGE 18-39: CPT | Performed by: OBSTETRICS & GYNECOLOGY

## 2025-03-11 RX ORDER — MEDROXYPROGESTERONE ACETATE 150 MG/ML
150 INJECTION, SUSPENSION INTRAMUSCULAR
Qty: 1 ML | Refills: 4 | Status: SHIPPED | OUTPATIENT
Start: 2025-03-11

## 2025-03-11 NOTE — PROGRESS NOTES
CC:  Annual GYN exam    HPI:  35 y.o.   presents today for a routine gynecological examination.  No LMP recorded. (Menstrual status: Chemically Induced)..      Contraception:  DMPA  + Migraine with aura not a candidate for estrogen therapy       Menses:  Just like spotting     Sexually active   Desires STD testing. + dysuria as well.     GYN HISTORY:  As per HPI     Last Pap:  2023  Hx of Abnl Paps: no    Hx STDs: Yes (see below)        OB History          7    Para   5    Term   2       3    AB   2    Living   5         SAB   1    IAB        Ectopic   1    Molar        Multiple   0    Live Births   1                  Past Medical History:   Diagnosis Date    Acute UTI 2021    Chlamydia     Chronic anemia 2021    Hypokalemia 2021    Migraine with aura 2021    Right nephrolithiasis 2021    Trichimoniasis 2023    treated by ED         Past Surgical History:   Procedure Laterality Date    DILATION AND CURETTAGE OF UTERUS      SAB    ECTOPIC PREGNANCY SURGERY      unsure of side and if tube was removed    WISDOM TOOTH EXTRACTION           Outpatient Encounter Medications as of 3/11/2025   Medication Sig Dispense Refill    medroxyPROGESTERone (DEPO-PROVERA) 150 MG/ML injection Inject 1 mL into the muscle every 3 months 1 mL 4    [DISCONTINUED] medroxyPROGESTERone (DEPO-PROVERA) 150 MG/ML injection Inject 1 mL into the muscle every 3 months 1 mL 0     No facility-administered encounter medications on file as of 3/11/2025.         Allergies   Allergen Reactions    Crab Extract Angioedema and Swelling     Other reaction(s): Angioedema-Allergy, Hives/Swelling-Allergy      Sulfa Antibiotics Hives         Family History   Problem Relation Age of Onset    Breast Cancer Neg Hx     Cancer Neg Hx     Colon Cancer Neg Hx     Ovarian Cancer Neg Hx          Social History     Socioeconomic History    Marital status: Single   Tobacco Use    Smoking status: Never    Smokeless

## 2025-03-13 ENCOUNTER — RESULTS FOLLOW-UP (OUTPATIENT)
Dept: OBGYN CLINIC | Age: 35
End: 2025-03-13

## 2025-03-13 LAB
BACTERIA SPEC CULT: ABNORMAL
BACTERIA SPEC CULT: ABNORMAL
C TRACH RRNA SPEC QL NAA+PROBE: NEGATIVE
N GONORRHOEA RRNA SPEC QL NAA+PROBE: NEGATIVE
SERVICE CMNT-IMP: ABNORMAL
SPECIMEN SOURCE: NORMAL
T VAGINALIS RRNA SPEC QL NAA+PROBE: NEGATIVE

## 2025-03-14 RX ORDER — NITROFURANTOIN 25; 75 MG/1; MG/1
100 CAPSULE ORAL 2 TIMES DAILY
Qty: 10 CAPSULE | Refills: 0 | Status: SHIPPED | OUTPATIENT
Start: 2025-03-14 | End: 2025-03-19

## 2025-03-31 DIAGNOSIS — Z30.09 FAMILY PLANNING: ICD-10-CM

## 2025-03-31 RX ORDER — MEDROXYPROGESTERONE ACETATE 150 MG/ML
150 INJECTION, SUSPENSION INTRAMUSCULAR
Qty: 1 ML | Refills: 4 | Status: SHIPPED | OUTPATIENT
Start: 2025-03-31

## 2025-04-01 ENCOUNTER — CLINICAL DOCUMENTATION (OUTPATIENT)
Dept: OBGYN CLINIC | Age: 35
End: 2025-04-01

## 2025-04-01 NOTE — PROGRESS NOTES
Pt presents to the office today for depro provera injection pt last injection was not documented in her chart from 1/14/2025 but pt states she got her injection that day and her insurance card was scanned In that day also. I did run a pregnancy test here today that was negative.       Last Depo-Provera: 1/142025.  Side Effects if any: none.  Serum HCG indicated? no.  Depo-Provera 150 mg IM given by: chelo torres cma.  Lot # [id number]   Expires 4/1/2027   Next injection:6/17/25-7/1/25.

## 2025-06-17 ENCOUNTER — CLINICAL DOCUMENTATION (OUTPATIENT)
Dept: OBGYN CLINIC | Age: 35
End: 2025-06-17

## 2025-06-17 NOTE — PROGRESS NOTES
Pt presents today for depo injection. Medication supplied by patient.   Last Depo-Provera: 4/1/25.  Side Effects if any: NA.  Serum HCG indicated? NA.    Depo-Provera 150 mg IM given by: Daniella Ramos RN.  Dose - 150mg  Injection Site - left deltoid   - [employer]  Lot # - [id number]  Expiration - 04/01/27  NDC - 26891-032-17    Pt tolerated well     Next appointment due between 9/2-9/16/25.